# Patient Record
Sex: MALE | Race: ASIAN | Employment: UNEMPLOYED | URBAN - METROPOLITAN AREA
[De-identification: names, ages, dates, MRNs, and addresses within clinical notes are randomized per-mention and may not be internally consistent; named-entity substitution may affect disease eponyms.]

---

## 2017-12-29 ENCOUNTER — TRANSFERRED RECORDS (OUTPATIENT)
Dept: HEALTH INFORMATION MANAGEMENT | Facility: CLINIC | Age: 57
End: 2017-12-29

## 2018-01-03 ENCOUNTER — TRANSFERRED RECORDS (OUTPATIENT)
Dept: HEALTH INFORMATION MANAGEMENT | Facility: CLINIC | Age: 58
End: 2018-01-03

## 2018-01-10 ENCOUNTER — TRANSFERRED RECORDS (OUTPATIENT)
Dept: HEALTH INFORMATION MANAGEMENT | Facility: CLINIC | Age: 58
End: 2018-01-10

## 2018-01-15 ENCOUNTER — CARE COORDINATION (OUTPATIENT)
Dept: SURGERY | Facility: CLINIC | Age: 58
End: 2018-01-15

## 2018-01-15 NOTE — PROGRESS NOTES
Surgical Oncology/Hepatobiliary Surgery Referral      Referring provider: Dr. Stock - [facility]      Referred to: Dr. Nunez - Surgical oncology - Hepatobiliary Surgeon      Referral Received: 01/15/18      Evaluation for: HCC      Oncology provider (if applicable): Dr. Stock      Clinical History (per RN review of records provided):      - History of chronic active Hepatitis B, was told he had it over 17 years ago.     - Patient followed with hepatitis serologies and ultrasounds over the years. Seen most recently with Frances Garza NP with [facility]     - Most recent abdominal US revealed lesion in the right lobe, patient was then referred for MRI for further evaluation.     - AFP was normal at 2.7 on 12/14/2017 and has been followed back to 2014.     - LFTs are all WNL as well as of 12/14/2017.     - Patient was sent for biopsies of mass based on imaging characteristics and normal AFP to confirm dx.     Imaging: Full reports are available in Care Everywhere.     MRI  12/21/2017   CONCLUSION:  1.  Heterogeneous appearance of the liver. Enlarging lobulated mass in the right hepatic lobe posteriorly, worrisome for a gradually enlarging hepatocellular carcinoma. Likely foci of intrahepatic shunting. No adenopathy, ascites or central venous thrombus.  2.  Right adrenal gland abuts the lobulated right hepatic mass.  3.  Above study performed and interpreted in consultation with Dr. Jeni Mullins.    CT Chest 1/10/2018  CONCLUSION:   1.  Tiny left lung nodules.  2.  Mild tubular bronchiectasis.  3.  Borderline right hilar adenopathy.  4.  Multiple enhancing liver lesions presumably related to the patient's known hepatocellular carcinoma.    US 12/1/2017  CONCLUSION:  1.  There is a hyperechoic lesion in the right hepatic lobe which has increased in size currently measures 3.9 x 2.6 x 3.5 cm. Given the patient's underlying hepatitis B and the increase in size of this lesion,  further evaluation with MRI is recommended. The other 2 lesions are relatively stable.    Pathology:     US guided biopsy 1/10/2018:   Final Cytologic Diagnosis:   Liver, Right Posterior Lobe, Ultrasound Guided Needle Core Biopsy:   POSITIVE FOR MALIGNANCY   Hepatocellular Carcinoma        Staging complete (if applicable): Yes.

## 2018-01-18 ENCOUNTER — OFFICE VISIT (OUTPATIENT)
Dept: SURGERY | Facility: CLINIC | Age: 58
End: 2018-01-18
Attending: SURGERY
Payer: COMMERCIAL

## 2018-01-18 VITALS
DIASTOLIC BLOOD PRESSURE: 73 MMHG | WEIGHT: 147.71 LBS | HEIGHT: 66 IN | OXYGEN SATURATION: 99 % | TEMPERATURE: 97.7 F | RESPIRATION RATE: 16 BRPM | BODY MASS INDEX: 23.74 KG/M2 | HEART RATE: 90 BPM | SYSTOLIC BLOOD PRESSURE: 115 MMHG

## 2018-01-18 DIAGNOSIS — C22.0 HCC (HEPATOCELLULAR CARCINOMA) (H): Primary | ICD-10-CM

## 2018-01-18 PROCEDURE — T1013 SIGN LANG/ORAL INTERPRETER: HCPCS | Mod: U3,ZF

## 2018-01-18 PROCEDURE — G0463 HOSPITAL OUTPT CLINIC VISIT: HCPCS | Mod: ZF

## 2018-01-18 RX ORDER — SIMETHICONE 125 MG
125 TABLET,CHEWABLE ORAL
COMMUNITY
Start: 2018-01-03

## 2018-01-18 RX ORDER — ACETAMINOPHEN 500 MG
500 TABLET ORAL
Status: ON HOLD | COMMUNITY
Start: 2016-03-15 | End: 2018-01-25

## 2018-01-18 RX ORDER — GLYBURIDE 5 MG/1
5 TABLET ORAL
COMMUNITY
Start: 2017-12-14 | End: 2018-12-14

## 2018-01-18 RX ORDER — ATORVASTATIN CALCIUM 40 MG/1
40 TABLET, FILM COATED ORAL
COMMUNITY
Start: 2017-11-21

## 2018-01-18 RX ORDER — TENOFOVIR DISOPROXIL FUMARATE 300 MG/1
300 TABLET, FILM COATED ORAL
COMMUNITY
Start: 2017-12-29

## 2018-01-18 RX ORDER — GLUCOSAMINE HCL/CHONDROITIN SU 500-400 MG
CAPSULE ORAL
COMMUNITY
Start: 2014-09-16

## 2018-01-18 RX ORDER — LANCETS 30 GAUGE
EACH MISCELLANEOUS
COMMUNITY
Start: 2014-09-16

## 2018-01-18 ASSESSMENT — PAIN SCALES - GENERAL: PAINLEVEL: NO PAIN (0)

## 2018-01-18 NOTE — LETTER
1/18/2018       RE: Ceasar Hui  [address]  [place] [address]     Dear Colleague,    Thank you for referring your patient, Ceasar Hui, to the [facility]. Please see a copy of my visit note below.    REASON FOR EVALUATION:  Newly diagnosed hepatocellular cancer.      HISTORY OF PRESENT ILLNESS:  Mr. Hui is a 57-year-old gentleman who was recently found to have an enlarging mass in the posterior right lobe of his liver which previously measured about 2 cm but on most recent imaging measured 4.8 cm in greatest dimension.  He has a known history of hepatitis B and is being treated currently with tenofovir.  He is otherwise in excellent health.  He was seen by Dr. Wilder Stock from medical oncology at [facility] and I was asked by Dr. tSock to see Mr. Hui for consideration of surgical resection.      PAST MEDICAL HISTORY:  Hyperlipidemia, vitamin D deficiency, depression, psychosis, diabetes, newly diagnosed hepatocellular carcinoma, chronic hepatitis B.      MEDICATIONS:  Acetaminophen, aspirin, atorvastatin, Accu-Chek blood glucose strips, vitamin D3, glyburide, metformin and tenofovir.      SOCIAL HISTORY:  The patient is a native of [place].  He presents to the office today with his 2 sons, Khari and Darren.  Mr. Hui is currently not working due to disability.  He immigrated in 2000.  He currently lives in [place] with his wife.        He is fully functional with regard to his activities of daily living and has an ECOG performance status of 0.      LABORATORY:  Lab evaluations include a bilirubin of 0.6, a creatinine of 0.8 and alpha-fetoprotein of 2.7.  His albumin is 4.3.  The patient did have a biopsy performed of his liver lesion on 01/10/2018 and that was positive for malignancy consistent with hepatocellular carcinoma.      I had a long discussion with Mr. Hui and his sons today in the presence of an  discussing the management of hepatocellular cancer in the absence  of any evidence of cirrhosis.  I discussed that surgical resection is typically the primary treatment, although transplantation is sometimes necessary, particularly for potential intrahepatic recurrences in the future.  Mr. Hui's lesion lies on the posterior right lobe of the liver, segment 6/7, and should be surgically resectable.  I discussed that we would plan on a laparoscopic hand-assisted partial right hepatectomy with intraoperative ultrasound.  I discussed risks of surgery including bleeding, infection, bile leak, worsening liver function, venous thromboembolism and other unforeseeable events.  Overall, he seems to be in good health and has adequate liver function and I think will tolerate the surgery quite well.  I did also clearly discuss the risks of recurrent disease.  I also touched on the possibility of future transplantation.  However, given his preserved liver function and the resectability of this lesion, surgical resection at this time would be most appropriate.      We will discuss Mr. Hui's case at our weekly multidisciplinary liver tumor conference tomorrow and will tentatively move forward with getting him on the operating room schedule.      A total of over an hour was spent on this case, more than half that time was in face-to-face time with Mr. Hui and his family today.       Again, thank you for allowing me to participate in the care of your patient.      Sincerely,    Esequiel Nunez MD

## 2018-01-18 NOTE — MR AVS SNAPSHOT
After Visit Summary   1/18/2018    Ceaasr Hui    MRN: [id number]           Patient Information     Date Of Birth          1960        Visit Information        Provider Department      1/18/2018 9:15 AM Mary Lou Echevarria; Esequiel Nunez MD [facility]        Today's Diagnoses     HCC (hepatocellular carcinoma) (H)    -  1      Care Instructions    Below is a brief summary of your discussion and care plan from today's visit below.   ______________________________________________________________________    As discussed with Dr. Nunez we will proceed with the following:     -Surgical intervention on 1/25/2018.  Check in at the admissions desk at 6:30 am and procedure is scheduled to start at 8:30 am.     The following studies and appointment will need to be completed prior to your surgery date. Failure to complete these could lead to canceling or a delay to your surgery.     Please schedule your pre op history and physical with your primary care provider within 30 days of surgery date. If the provider is not a [facility] provider please have information faxed to [phone].      If you do not have a primary care provider we can also schedule you with our preoperative assessment center for a preoperative history and physical.     ______________________________________________________________________    It was a pleasure seeing you in clinic today - please be in touch if there are any further questions that arise following today's visit.  During business hours, you may reach my Clinic Coordinator at [phone].  For urgent/emergent questions after business hours, you may reach the on-call Surgery Resident by contacting the [facility]  at [phone].    Any benign/non-urgent test results are usually communicated via letter or [facility] message within 1-2 weeks after completion.  Urgent results (those that require a change in the previously-discussed care plan) are  usually communicated via a phone call once available from our clinic staff to discuss the results and the next steps in your evaluation.    I recommend signing up for eco4cloudt access if you have not already done so and are comfortable with using a computer.  This allows for online access to your lab results and also helps you communicate efficiently with my clinic should any questions arise in your care.      Sincerely,    Madisyn Tyler RN  Care Coordinator -   Phone: [phone]  Fax: [phone]      SURGERY PACKET            Your surgery is scheduled:    Date: 1/25/2018  ________________________________    Time: 8:30 am   ________________________________    Please arrive at:  6:30 am   ________________________________    Surgeon's Name: Dr. Nunez  ________________________________        Pre-Op Physical Fax Numbers:             Critical access hospital Pre-Admissions      [address]      Fax: [phone]      Phone: [phone]        Your surgery is located at:      [address] [address] [phone]      [email or web address]       Before Your Surgery  For Patients and Visitors at [facility]    Welcome  As you get ready for surgery, you may have a lot of questions.  This brochure will help you know what to expect before and after surgery.  You and your family are the most important members of your health care team.  You will need to take an active role in your care.    Be sure to ask questions and learn all that you can about your surgery.  If you have any safety concerns, we urge you to tell a nurse as soon as possible.   This brochure is for information only.  It does not replace the advice of your doctor.  Always follow your doctor's advice.    Please tell us if you need a .    GETTING READY FOR SURGERY  Always follow your surgeon's instructions.  If you don't, your surgery could  be canceled.  Please use the following checklist.    Within 30 Days of Surgery:    Have a pre-surgery physical exam with your family doctor or partner or our Preoperative Ass[facility]ment Center (PAC).     If you use a [facility], all of your information from the pre-op physical will be in the [employer] computer system.     Failure to complete the pre-operative history and physical will results in cancellation and rescheduling the procedure.     Ask the doctor to send all of your results to the hospital before the surgery.  The doctor also may ask you to bring the results with you on the day of surgery or you can fax them to [phone].   Tell the doctor if:    You are allergic to latex or rubber  (Latex and rubber gloves are often used in medical care).    You are taking any medicines (including aspirin), vitamins (Vitamin E, Fish Oil, Omegas) or herbal products.  You will need to stop taking some medicines before surgery.    You have any medical problems (allergies, diabetes or heart disease, for example).    You have a pacemaker or an AICD (automatic implanted cardiac defibrillator).  If you do, please bring the ID card with you on the day of surgery.    You are a smoker.  People who smoke have a higher risk of infection after surgery.  Ask your doctor how you can quit smoking.  Within 7 days of Surgery:    Pre-register with the hospital.  Please use the hospital's phone number listed on the first page of this brochure.  Or, to register online, go to [email or web address].      Prior to your surgical procedure, a nurse will be contacting you to obtain a health history ([phone]).  Additionally, someone from the Admissions Department will also contact you for preregistration ([phone]).      Call your insurance company.  Ask if you need pre-approval for your surgery.  If you do not have insurance, please let us know.      Arrange for someone to drive you home after surgery.  If you will have same-day  surgery, you may not drive or take public transportation home by yourself.    Arrange for someone to stay with you for 24 hours after you go home.  This person must be a responsible adult (ie- Family member or friend).  The Day Before Surgery:     Call your surgeon if there are any changes in your health.  This includes signs of a cold or flu (such as a sore throat, runny nose, cough, rash or fever).    Do not smoke, drink alcohol or take over-the-counter medicine (unless your surgeon tells you to) for 24 hours before and after surgery.    If you take prescribed drugs:  You may need to stop them until after the surgery.  Follow your doctor's orders.  You may resume Aspirin and/or blood thinners after your surgery as directed by your physician/surgeon.    NO FOOD OR DRINK AFTER MIDNIGHT.  Follow your surgeon's orders for eating and drinking.  You need to have an empty stomach before surgery.  This will make the surgery as safe as possible.  If you don't follow your doctor's orders, your surgery could be changed to another date.  A nurse will call you within a few days of surgery to go over these and other instructions.  If you do not hear from them, please call them at [phone].   The Day of Surgery:    Take a shower or bath the night before and the morning of surgery.  Use antiseptic soap or the soap your surgeon gave you.  Gently clean the skin.  Do not shave or scrub your surgery site.    Please remove deodorant, cologne, scented lotion, makeup, nail polish and jewelry (including rings and body piercings).  If you wear artificial nails, please remove at least one nail before coming to the hospital.    Wear clean, loose clothing to the hospital.    Bring these items to the hospital:  1. Your insurance card.  2. Money for parking and co-pays (for medicines or the surgery), if needed.   3. A list of all the medicines you take.  Include vitamins, minerals, herbs and over-the-counter drugs.  Note any drug  allergies.  4. A copy of your advance health care directive, if you have one.  This tells us what treatment you would want -- and who would make health care decisions -- if you could no longer speak for yourself.  You may request this form in advance or download it from [email or web address].  5. A case for any glasses, contact lenses, hearing aids or dentures.  6. Your inhaler or CPAP machine, if you use these at home.  Leave extra cash, jewelry and other valuables at home.    When You Arrive:  When you get to the hospital, you will:    Check in.  If you are under age 18, you must be with a parent or legal guardian.    Sign consent forms, if you haven't already.  These forms state that you know the risks and benefits of surgery.  When you sign the forms, you give us permission to do the surgery.  Do not sign them unless you understand what will happen during and after your surgery.  If you have any questions about your surgery, ask to speak with your doctor before you sign the forms.  If you don't understand the answers, ask again.    Receive a copy of the Patients Bill of Rights.  If you do not receive a copy, please ask for one.    Change into hospital clothes.  Your belongings will be placed in a bag.  We will return them to you after surgery.    Meet with the anesthesia provider.  He or she will tell you what kind of anesthesia (medicine) will be used to keep you comfortable during surgery.  Remember: It's okay to remind doctors and nurses to wash their hands before touching you.   In most cases, your surgeon will use a marker to write his or her initials on the surgery site.  This ensures that the exact site is operated on.  For safety reasons, we will ask you the same questions many times.  For example, we may ask your name and birth date over and over again.  Friends and family can stay with you until it's time for surgery.  While you're in surgery, they will be in the waiting area.  Please note that cell  "phones are not allowed in some patient care areas.  If you have questions about what will happen in the operating room, talk to your care team.  After Surgery:    We will move you to a recovery room where we will watch you closely.  If you have any pain or discomfort, tell your nurse.  He or she will try to make you comfortable.      If you are staying overnight we will move you to your hospital room after you are awake.    If you are going home we will move you to another room.  Friends and family may be able to join you.  The length of time you spend in recovery depends on the type of medicine you received, your medical condition, and the type of surgery you had.  Dealing with pain:  A nurse will check your comfort level often during your stay.  He or she will work with you to manage your pain.  Remember:    All pain is real.  There are many ways to control pain.  We can help you decide what works best for you.    Ask for pain medicine when you need it.  Don't try to \"tough it out\" -- this can make you feel worse.  Always take your medicine as ordered.    Medicine doesn't work the same for everyone.  If your medicine isn't working tell your nurse.  There may be other medicines or treatments we can try.  Going Home:  We will let you know when you're ready to leave the hospital.  Before you leave, we will tell you how to care for yourself at home and prevent infections.  If you do not understand something, please say so.  We will answer any questions you have.  We will then help you get ready to leave.  You must have an adult with you for the first 24 hours after you leave the hospital. Take it easy when you get home.  You will need some time to recover -- you may be more tired than you realize at first.  Rest and relax for at least the first 24 hours at home.  You'll feel better and heal faster if you take good care of yourself.                      Pre-Operative Surgery Scrub    Purpose:   The skin harbors a large " variety of bacteria, both infectious and noninfectious.  Showering with an antiseptic soap prior to an invasive procedure will decrease the number of transient and resident (good and bad) bacteria that is normally found on the skin.    Procedure:      Shower or bathe with 1/2 of the bottle of antiseptic soap (enclosed) the evening before and 1/2 of the bottle the morning of surgery (bathing the day of the procedure is most important).       Apply the soap at full strength (use the entire bottle).  Gently clean the skin, rinse, and dry with a clean towel that is freshly laundered (out of the dryer) and then put on clean clothing that is freshly laundered.        We have given you information regarding pre-op showering.  We recommend that patients wash with an antiseptic soap prior to surgery.  This cleanser will be given to you at the clinic or mailed to your home.  It is advised that you liberally wash the specific area surgery area the night before, and again in the morning before the surgery.  Do not apply lotion afterward.  We would like to keep the skin as clean as possible.    Thank you for following these important instructions.      You have been scheduled for surgery and we would like to give you some information that will assist in helping get the best possible outcome.      Before Surgery:     If for any reason you decide not to have the surgery, please contact our office at [phone].  We can easily cancel or reschedule the procedure. After hours: [facility] [phone], [place] [phone]).      Any pain related to the surgery that occurs before the surgery needs to be reported and managed by your primary care or referring doctor.      Please keep in mind that the time of surgery is subject to change.  Make sure you have nothing to eat or drink after midnight.  If your surgery is later in the afternoon, this recommendation might change, but not until the day before surgery after the actual time of the  surgery has been established.    After Surgery:  When you are discharged from the recovery room, the nurses will review instructions with you and your caregiver.      Please wash your hands every time you touch the wound or change bandages or dressings.      Do not submerge the wound in water.  You may not use a bathtub or hot tub until the wound is closed.  The wait time frame is generally 2-3 weeks but any open area can be a source of incoming bacteria, so it is better to be on the safe side and avoid the tube until your wound is fully healed.      You may take a shower 24 hours after surgery.  Double check with your surgeon if it is ok for water to run over a wound, whether it has been sutured, stapled, glued or is open.  You may gently wash the wound using the antiseptic soap provided for your pre-surgery showering (do not use a washcloth).  Any mild soap will work as well.      Many surgical wounds will have small white strips of tape on them called Steri Strips.  Do not remove these.  The edges will curl and fall off within 7-10 days with normal showering.      If you are going home with sutures (stitches) or staples, you must return to the clinic to have them taken out, usually within 1-2 weeks.      Signs and symptoms of infection include:  1. Fever, temperature over 101.5 ' F  2. Redness  3. Swelling  4. Increasing pain  5. Green or yellow drainage which may or may not have a foul odor.      If you are deaf or hard of hearing, please let us know.  We provide many free services including sign language interpreters, oval interpreters, TTYs, telephone amplifiers, note takers, and written materials.                    Follow-ups after your visit        Who to contact     If you have questions or need follow up information about today's clinic visit or your schedule please contact [facility] directly at [phone].  Normal or non-critical lab and imaging results will be communicated to you  "by [employer], letter or phone within 4 business days after the clinic has received the results. If you do not hear from us within 7 days, please contact the clinic through [employer] or phone. If you have a critical or abnormal lab result, we will notify you by phone as soon as possible.  Submit refill requests through [employer] or call your pharmacy and they will forward the refill request to us. Please allow 3 business days for your refill to be completed.          Additional Information About Your Visit        [employer] Information     [employer] lets you send messages to your doctor, view your test results, renew your prescriptions, schedule appointments and more. To sign up, go to [email or web address] . Click on \"Log in\" on the left side of the screen, which will take you to the Welcome page. Then click on \"Sign up Now\" on the right side of the page.     You will be asked to enter the access code listed below, as well as some personal information. Please follow the directions to create your username and password.     Your access code is: [email or web address]  Expires: 2018  6:30 AM     Your access code will  in 90 days. If you need help or a new code, please call your [facility] clinic or [phone].        Care EveryWhere ID     This is your Care EveryWhere ID. This could be used by other organizations to access your [facility] medical records  [id number]        Your Vitals Were     Pulse Temperature Respirations Height Pulse Oximetry BMI (Body Mass Index)    90 97.7  F (36.5  C) (Oral) 16 1.686 m (5' 6.38\") 99% 23.57 kg/m2       Blood Pressure from Last 3 Encounters:   18 115/73    Weight from Last 3 Encounters:   18 67 kg (147 lb 11.3 oz)              We Performed the Following     Madhavi-Operative Worksheet        Primary Care Provider    None Specified       No primary provider on file.        Equal Access to Services     JAMA CASTELLANOS AH: Hadii minna Brumfield, rené salgado, becca cummings " meghan quevedoalfie krugeraan ah. So [facility] [phone].    ATENCIÓN: Si maurila marck, tiene a angelo disposición servicios gratuitos de asistencia lingüística. Celestina al [phone].    We comply with applicable federal civil rights laws and [place] laws. We do not discriminate on the basis of race, color, national origin, age, disability, sex, sexual orientation, or gender identity.            Thank you!     Thank you for choosing [facility]  for your care. Our goal is always to provide you with excellent care. Hearing back from our patients is one way we can continue to improve our services. Please take a few minutes to complete the written survey that you may receive in the mail after your visit with us. Thank you!             Your Updated Medication List - Protect others around you: Learn how to safely use, store and throw away your medicines at www.disposemymeds.org.          This list is accurate as of: 1/18/18 10:52 AM.  Always use your most recent med list.                   Brand Name Dispense Instructions for use Diagnosis    acetaminophen 500 MG tablet    TYLENOL     Take 500 mg by mouth        atorvastatin 40 MG tablet    LIPITOR     Take 40 mg by mouth        BLOOD GLUCOSE TEST STRIPS Strp      two times a day. Use as directed. Pharmacy dispense brand based on insurance.        glyBURIDE 5 MG tablet    DIABETA /MICRONASE     Take 5 mg by mouth        Lancets Misc      Testing blood sugars 2 time(s) a day. Pharmacist may substitute meter/supplies if insurance or patient requires specific equipment or model        metFORMIN 1000 MG tablet    GLUCOPHAGE     Take 1,000 mg by mouth        simethicone 125 MG Chew chewable tablet    MYLICON     Take 125 mg by mouth        tenofovir 300 MG tablet    VIREAD     Take 300 mg by mouth        vitamin D3 1000 UNITS Caps      Take 1,000 Units by mouth

## 2018-01-18 NOTE — PROGRESS NOTES
REASON FOR EVALUATION:  Newly diagnosed hepatocellular cancer.      HISTORY OF PRESENT ILLNESS:  Mr. Hui is a 57-year-old gentleman who was recently found to have an enlarging mass in the posterior right lobe of his liver which previously measured about 2 cm but on most recent imaging measured 4.8 cm in greatest dimension.  He has a known history of hepatitis B and is being treated currently with tenofovir.  He is otherwise in excellent health.  He was seen by Dr. Wilder Stock from medical oncology at [facility] and I was asked by Dr. Stock to see Mr. Hui for consideration of surgical resection.      PAST MEDICAL HISTORY:  Hyperlipidemia, vitamin D deficiency, depression, psychosis, diabetes, newly diagnosed hepatocellular carcinoma, chronic hepatitis B.      MEDICATIONS:  Acetaminophen, aspirin, atorvastatin, Accu-Chek blood glucose strips, vitamin D3, glyburide, metformin and tenofovir.      SOCIAL HISTORY:  The patient is a native of [place].  He presents to the office today with his 2 sons, Jada.  Mr. Hui is currently not working due to disability.  He immigrated in 2000.  He currently lives in [place] with his wife.        He is fully functional with regard to his activities of daily living and has an ECOG performance status of 0.      LABORATORY:  Lab evaluations include a bilirubin of 0.6, a creatinine of 0.8 and alpha-fetoprotein of 2.7.  His albumin is 4.3.  The patient did have a biopsy performed of his liver lesion on 01/10/2018 and that was positive for malignancy consistent with hepatocellular carcinoma.      I had a long discussion with Mr. Hui and his sons today in the presence of an  discussing the management of hepatocellular cancer in the absence of any evidence of cirrhosis.  I discussed that surgical resection is typically the primary treatment, although transplantation is sometimes necessary, particularly for potential intrahepatic recurrences in the future.   Mr. Hui's lesion lies on the posterior right lobe of the liver, segment 6/7, and should be surgically resectable.  I discussed that we would plan on a laparoscopic hand-assisted partial right hepatectomy with intraoperative ultrasound.  I discussed risks of surgery including bleeding, infection, bile leak, worsening liver function, venous thromboembolism and other unforeseeable events.  Overall, he seems to be in good health and has adequate liver function and I think will tolerate the surgery quite well.  I did also clearly discuss the risks of recurrent disease.  I also touched on the possibility of future transplantation.  However, given his preserved liver function and the resectability of this lesion, surgical resection at this time would be most appropriate.      We will discuss Mr. Hui's case at our weekly multidisciplinary liver tumor conference tomorrow and will tentatively move forward with getting him on the operating room schedule.      A total of over an hour was spent on this case, more than half that time was in face-to-face time with Mr. Hui and his family today.

## 2018-01-18 NOTE — NURSING NOTE
"Oncology Rooming Note    January 18, 2018 9:54 AM   Ceasar Hui is a 57 year old male who presents for:    Chief Complaint   Patient presents with     Oncology Clinic Visit     New for HCC     Initial Vitals: /73  Pulse 90  Temp 97.7  F (36.5  C) (Oral)  Resp 16  Ht 1.686 m (5' 6.38\")  Wt 67 kg (147 lb 11.3 oz)  SpO2 99%  BMI 23.57 kg/m2 Estimated body mass index is 23.57 kg/(m^2) as calculated from the following:    Height as of this encounter: 1.686 m (5' 6.38\").    Weight as of this encounter: 67 kg (147 lb 11.3 oz). Body surface area is 1.77 meters squared.  No Pain (0) Comment: Data Unavailable   No LMP for male patient.  Allergies reviewed: Yes  Medications reviewed: Yes    Medications: Medication refills not needed today.  Pharmacy name entered into EPIC: Data Unavailable    Clinical concerns: results  Enrique  was notified.    10 minutes for nursing intake (face to face time)     Milagro Kee MA              "

## 2018-01-18 NOTE — PATIENT INSTRUCTIONS
Below is a brief summary of your discussion and care plan from today's visit below.   ______________________________________________________________________    As discussed with Dr. Nunez we will proceed with the following:     -Surgical intervention on 1/25/2018.  Check in at the admissions desk at 6:30 am and procedure is scheduled to start at 8:30 am.     The following studies and appointment will need to be completed prior to your surgery date. Failure to complete these could lead to canceling or a delay to your surgery.     Please schedule your pre op history and physical with your primary care provider within 30 days of surgery date. If the provider is not a [facility] provider please have information faxed to [phone].      If you do not have a primary care provider we can also schedule you with our preoperative assessment center for a preoperative history and physical.     ______________________________________________________________________    It was a pleasure seeing you in clinic today - please be in touch if there are any further questions that arise following today's visit.  During business hours, you may reach my Clinic Coordinator at [phone].  For urgent/emergent questions after business hours, you may reach the on-call Surgery Resident by contacting the [facility]  at [phone].    Any benign/non-urgent test results are usually communicated via letter or MyOtherDrivehart message within 1-2 weeks after completion.  Urgent results (those that require a change in the previously-discussed care plan) are usually communicated via a phone call once available from our clinic staff to discuss the results and the next steps in your evaluation.    I recommend signing up for [employer] access if you have not already done so and are comfortable with using a computer.  This allows for online access to your lab results and also helps you communicate efficiently with my clinic should any  questions arise in your care.      Sincerely,    Madisyn Tyler RN  Care Coordinator -   Phone: [phone]  Fax: [phone]      SURGERY PACKET            Your surgery is scheduled:    Date: 1/25/2018  ________________________________    Time: 8:30 am   ________________________________    Please arrive at:  6:30 am   ________________________________    Surgeon's Name: Dr. Nunez  ________________________________        Pre-Op Physical Fax Numbers:             [facility] Pre-Admissions      [address]      Fax: [phone]      Phone: [phone]        Your surgery is located at:      [address] [address]      [phone]      [email or web address]       Before Your Surgery  For Patients and Visitors at [facility]    Welcome  As you get ready for surgery, you may have a lot of questions.  This brochure will help you know what to expect before and after surgery.  You and your family are the most important members of your health care team.  You will need to take an active role in your care.    Be sure to ask questions and learn all that you can about your surgery.  If you have any safety concerns, we urge you to tell a nurse as soon as possible.   This brochure is for information only.  It does not replace the advice of your doctor.  Always follow your doctor's advice.    Please tell us if you need a .    GETTING READY FOR SURGERY  Always follow your surgeon's instructions.  If you don't, your surgery could be canceled.  Please use the following checklist.    Within 30 Days of Surgery:    Have a pre-surgery physical exam with your family doctor or partner or our Preoperative Assessement Center (PAC).     If you use a [facility], all of your information from the pre-op physical will be in the [employer] computer system.     Failure to complete the pre-operative history and  physical will results in cancellation and rescheduling the procedure.     Ask the doctor to send all of your results to the hospital before the surgery.  The doctor also may ask you to bring the results with you on the day of surgery or you can fax them to [phone].   Tell the doctor if:    You are allergic to latex or rubber  (Latex and rubber gloves are often used in medical care).    You are taking any medicines (including aspirin), vitamins (Vitamin E, Fish Oil, Omegas) or herbal products.  You will need to stop taking some medicines before surgery.    You have any medical problems (allergies, diabetes or heart disease, for example).    You have a pacemaker or an AICD (automatic implanted cardiac defibrillator).  If you do, please bring the ID card with you on the day of surgery.    You are a smoker.  People who smoke have a higher risk of infection after surgery.  Ask your doctor how you can quit smoking.  Within 7 days of Surgery:    Pre-register with the hospital.  Please use the hospital's phone number listed on the first page of this brochure.  Or, to register online, go to [email or web address].      Prior to your surgical procedure, a nurse will be contacting you to obtain a health history ([phone]).  Additionally, someone from the Admissions Department will also contact you for preregistration ([phone]).      Call your insurance company.  Ask if you need pre-approval for your surgery.  If you do not have insurance, please let us know.      Arrange for someone to drive you home after surgery.  If you will have same-day surgery, you may not drive or take public transportation home by yourself.    Arrange for someone to stay with you for 24 hours after you go home.  This person must be a responsible adult (ie- Family member or friend).  The Day Before Surgery:     Call your surgeon if there are any changes in your health.  This includes signs of a cold or flu (such as a sore throat, runny nose,  cough, rash or fever).    Do not smoke, drink alcohol or take over-the-counter medicine (unless your surgeon tells you to) for 24 hours before and after surgery.    If you take prescribed drugs:  You may need to stop them until after the surgery.  Follow your doctor's orders.  You may resume Aspirin and/or blood thinners after your surgery as directed by your physician/surgeon.    NO FOOD OR DRINK AFTER MIDNIGHT.  Follow your surgeon's orders for eating and drinking.  You need to have an empty stomach before surgery.  This will make the surgery as safe as possible.  If you don't follow your doctor's orders, your surgery could be changed to another date.  A nurse will call you within a few days of surgery to go over these and other instructions.  If you do not hear from them, please call them at [phone].   The Day of Surgery:    Take a shower or bath the night before and the morning of surgery.  Use antiseptic soap or the soap your surgeon gave you.  Gently clean the skin.  Do not shave or scrub your surgery site.    Please remove deodorant, cologne, scented lotion, makeup, nail polish and jewelry (including rings and body piercings).  If you wear artificial nails, please remove at least one nail before coming to the hospital.    Wear clean, loose clothing to the hospital.    Bring these items to the hospital:  1. Your insurance card.  2. Money for parking and co-pays (for medicines or the surgery), if needed.   3. A list of all the medicines you take.  Include vitamins, minerals, herbs and over-the-counter drugs.  Note any drug allergies.  4. A copy of your advance health care directive, if you have one.  This tells us what treatment you would want -- and who would make health care decisions -- if you could no longer speak for yourself.  You may request this form in advance or download it from [email or web address].  5. A case for any glasses, contact lenses, hearing aids or dentures.  6. Your inhaler or  CPAP machine, if you use these at home.  Leave extra cash, jewelry and other valuables at home.    When You Arrive:  When you get to the hospital, you will:    Check in.  If you are under age 18, you must be with a parent or legal guardian.    Sign consent forms, if you haven't already.  These forms state that you know the risks and benefits of surgery.  When you sign the forms, you give us permission to do the surgery.  Do not sign them unless you understand what will happen during and after your surgery.  If you have any questions about your surgery, ask to speak with your doctor before you sign the forms.  If you don't understand the answers, ask again.    Receive a copy of the Patients Bill of Rights.  If you do not receive a copy, please ask for one.    Change into hospital clothes.  Your belongings will be placed in a bag.  We will return them to you after surgery.    Meet with the anesthesia provider.  He or she will tell you what kind of anesthesia (medicine) will be used to keep you comfortable during surgery.  Remember: It's okay to remind doctors and nurses to wash their hands before touching you.   In most cases, your surgeon will use a marker to write his or her initials on the surgery site.  This ensures that the exact site is operated on.  For safety reasons, we will ask you the same questions many times.  For example, we may ask your name and birth date over and over again.  Friends and family can stay with you until it's time for surgery.  While you're in surgery, they will be in the waiting area.  Please note that cell phones are not allowed in some patient care areas.  If you have questions about what will happen in the operating room, talk to your care team.  After Surgery:    We will move you to a recovery room where we will watch you closely.  If you have any pain or discomfort, tell your nurse.  He or she will try to make you comfortable.      If you are staying overnight we will move you to  "your hospital room after you are awake.    If you are going home we will move you to another room.  Friends and family may be able to join you.  The length of time you spend in recovery depends on the type of medicine you received, your medical condition, and the type of surgery you had.  Dealing with pain:  A nurse will check your comfort level often during your stay.  He or she will work with you to manage your pain.  Remember:    All pain is real.  There are many ways to control pain.  We can help you decide what works best for you.    Ask for pain medicine when you need it.  Don't try to \"tough it out\" -- this can make you feel worse.  Always take your medicine as ordered.    Medicine doesn't work the same for everyone.  If your medicine isn't working tell your nurse.  There may be other medicines or treatments we can try.  Going Home:  We will let you know when you're ready to leave the hospital.  Before you leave, we will tell you how to care for yourself at home and prevent infections.  If you do not understand something, please say so.  We will answer any questions you have.  We will then help you get ready to leave.  You must have an adult with you for the first 24 hours after you leave the hospital. Take it easy when you get home.  You will need some time to recover -- you may be more tired than you realize at first.  Rest and relax for at least the first 24 hours at home.  You'll feel better and heal faster if you take good care of yourself.                      Pre-Operative Surgery Scrub    Purpose:   The skin harbors a large variety of bacteria, both infectious and noninfectious.  Showering with an antiseptic soap prior to an invasive procedure will decrease the number of transient and resident (good and bad) bacteria that is normally found on the skin.    Procedure:      Shower or bathe with 1/2 of the bottle of antiseptic soap (enclosed) the evening before and 1/2 of the bottle the morning of surgery " (bathing the day of the procedure is most important).       Apply the soap at full strength (use the entire bottle).  Gently clean the skin, rinse, and dry with a clean towel that is freshly laundered (out of the dryer) and then put on clean clothing that is freshly laundered.        We have given you information regarding pre-op showering.  We recommend that patients wash with an antiseptic soap prior to surgery.  This cleanser will be given to you at the clinic or mailed to your home.  It is advised that you liberally wash the specific area surgery area the night before, and again in the morning before the surgery.  Do not apply lotion afterward.  We would like to keep the skin as clean as possible.    Thank you for following these important instructions.      You have been scheduled for surgery and we would like to give you some information that will assist in helping get the best possible outcome.      Before Surgery:     If for any reason you decide not to have the surgery, please contact our office at [phone].  We can easily cancel or reschedule the procedure. After hours: [facility] [phone], [place] [phone]).      Any pain related to the surgery that occurs before the surgery needs to be reported and managed by your primary care or referring doctor.      Please keep in mind that the time of surgery is subject to change.  Make sure you have nothing to eat or drink after midnight.  If your surgery is later in the afternoon, this recommendation might change, but not until the day before surgery after the actual time of the surgery has been established.    After Surgery:  When you are discharged from the recovery room, the nurses will review instructions with you and your caregiver.      Please wash your hands every time you touch the wound or change bandages or dressings.      Do not submerge the wound in water.  You may not use a bathtub or hot tub until the wound is closed.  The wait time frame is  generally 2-3 weeks but any open area can be a source of incoming bacteria, so it is better to be on the safe side and avoid the tube until your wound is fully healed.      You may take a shower 24 hours after surgery.  Double check with your surgeon if it is ok for water to run over a wound, whether it has been sutured, stapled, glued or is open.  You may gently wash the wound using the antiseptic soap provided for your pre-surgery showering (do not use a washcloth).  Any mild soap will work as well.      Many surgical wounds will have small white strips of tape on them called Steri Strips.  Do not remove these.  The edges will curl and fall off within 7-10 days with normal showering.      If you are going home with sutures (stitches) or staples, you must return to the clinic to have them taken out, usually within 1-2 weeks.      Signs and symptoms of infection include:  1. Fever, temperature over 101.5 ' F  2. Redness  3. Swelling  4. Increasing pain  5. Green or yellow drainage which may or may not have a foul odor.      If you are deaf or hard of hearing, please let us know.  We provide many free services including sign language interpreters, oval interpreters, TTYs, telephone amplifiers, note takers, and written materials.

## 2018-01-23 ENCOUNTER — ANESTHESIA EVENT (OUTPATIENT)
Dept: SURGERY | Facility: CLINIC | Age: 58
DRG: 406 | End: 2018-01-23
Payer: COMMERCIAL

## 2018-01-25 ENCOUNTER — OFFICE VISIT (OUTPATIENT)
Dept: INTERPRETER SERVICES | Facility: CLINIC | Age: 58
End: 2018-01-25
Payer: COMMERCIAL

## 2018-01-25 ENCOUNTER — ANESTHESIA (OUTPATIENT)
Dept: SURGERY | Facility: CLINIC | Age: 58
DRG: 406 | End: 2018-01-25
Payer: COMMERCIAL

## 2018-01-25 ENCOUNTER — HOSPITAL ENCOUNTER (INPATIENT)
Facility: CLINIC | Age: 58
LOS: 5 days | Discharge: HOME | DRG: 406 | End: 2018-01-30
Attending: SURGERY | Admitting: SURGERY
Payer: COMMERCIAL

## 2018-01-25 DIAGNOSIS — Z78.9 DEEP VEIN THROMBOSIS (DVT) PROPHYLAXIS PRESCRIBED AT DISCHARGE: ICD-10-CM

## 2018-01-25 DIAGNOSIS — C22.0 HEPATOCELLULAR CARCINOMA (H): Primary | ICD-10-CM

## 2018-01-25 DIAGNOSIS — G89.18 ACUTE POST-OPERATIVE PAIN: ICD-10-CM

## 2018-01-25 DIAGNOSIS — K59.00 CONSTIPATION, UNSPECIFIED CONSTIPATION TYPE: ICD-10-CM

## 2018-01-25 LAB
ABO + RH BLD: NORMAL
ABO + RH BLD: NORMAL
ALBUMIN SERPL-MCNC: 3.7 G/DL (ref 3.4–5)
ALP SERPL-CCNC: 75 U/L (ref 40–150)
ALT SERPL W P-5'-P-CCNC: 238 U/L (ref 0–70)
ANION GAP SERPL CALCULATED.3IONS-SCNC: 10 MMOL/L (ref 3–14)
AST SERPL W P-5'-P-CCNC: 303 U/L (ref 0–45)
BASE DEFICIT BLDA-SCNC: 3.6 MMOL/L
BASE EXCESS BLDA CALC-SCNC: 2.2 MMOL/L
BASOPHILS # BLD AUTO: 0 10E9/L (ref 0–0.2)
BASOPHILS NFR BLD AUTO: 0.1 %
BILIRUB DIRECT SERPL-MCNC: 0.2 MG/DL (ref 0–0.2)
BILIRUB SERPL-MCNC: 0.8 MG/DL (ref 0.2–1.3)
BLD GP AB SCN SERPL QL: NORMAL
BLOOD BANK CMNT PATIENT-IMP: NORMAL
BUN SERPL-MCNC: 23 MG/DL (ref 7–30)
CA-I BLD-MCNC: 4.5 MG/DL (ref 4.4–5.2)
CA-I BLD-MCNC: 4.6 MG/DL (ref 4.4–5.2)
CALCIUM SERPL-MCNC: 8.6 MG/DL (ref 8.5–10.1)
CHLORIDE SERPL-SCNC: 106 MMOL/L (ref 94–109)
CO2 SERPL-SCNC: 25 MMOL/L (ref 20–32)
CREAT SERPL-MCNC: 0.77 MG/DL (ref 0.66–1.25)
CREAT SERPL-MCNC: 0.8 MG/DL (ref 0.66–1.25)
CREAT SERPL-MCNC: 0.84 MG/DL (ref 0.66–1.25)
DIFFERENTIAL METHOD BLD: ABNORMAL
EOSINOPHIL # BLD AUTO: 0 10E9/L (ref 0–0.7)
EOSINOPHIL NFR BLD AUTO: 0 %
ERYTHROCYTE [DISTWIDTH] IN BLOOD BY AUTOMATED COUNT: 13.9 % (ref 10–15)
GFR SERPL CREATININE-BSD FRML MDRD: >90 ML/MIN/1.7M2
GLUCOSE BLD-MCNC: 164 MG/DL (ref 70–99)
GLUCOSE BLD-MCNC: 245 MG/DL (ref 70–99)
GLUCOSE BLDC GLUCOMTR-MCNC: 135 MG/DL (ref 70–99)
GLUCOSE BLDC GLUCOMTR-MCNC: 159 MG/DL (ref 70–99)
GLUCOSE BLDC GLUCOMTR-MCNC: 170 MG/DL (ref 70–99)
GLUCOSE BLDC GLUCOMTR-MCNC: 184 MG/DL (ref 70–99)
GLUCOSE BLDC GLUCOMTR-MCNC: 282 MG/DL (ref 70–99)
GLUCOSE SERPL-MCNC: 133 MG/DL (ref 70–99)
HCO3 BLD-SCNC: 21 MMOL/L (ref 21–28)
HCO3 BLD-SCNC: 26 MMOL/L (ref 21–28)
HCT VFR BLD AUTO: 42.6 % (ref 40–53)
HGB BLD-MCNC: 13.9 G/DL (ref 13.3–17.7)
HGB BLD-MCNC: 14.1 G/DL (ref 13.3–17.7)
HGB BLD-MCNC: 14.6 G/DL (ref 13.3–17.7)
HGB BLD-MCNC: 15.2 G/DL (ref 13.3–17.7)
IMM GRANULOCYTES # BLD: 0.1 10E9/L (ref 0–0.4)
IMM GRANULOCYTES NFR BLD: 0.5 %
INR PPP: 1.01 (ref 0.86–1.14)
LYMPHOCYTES # BLD AUTO: 1.7 10E9/L (ref 0.8–5.3)
LYMPHOCYTES NFR BLD AUTO: 9.5 %
MAGNESIUM SERPL-MCNC: 2 MG/DL (ref 1.6–2.3)
MCH RBC QN AUTO: 26.7 PG (ref 26.5–33)
MCHC RBC AUTO-ENTMCNC: 33.1 G/DL (ref 31.5–36.5)
MCV RBC AUTO: 81 FL (ref 78–100)
MONOCYTES # BLD AUTO: 1.9 10E9/L (ref 0–1.3)
MONOCYTES NFR BLD AUTO: 10.7 %
NEUTROPHILS # BLD AUTO: 14.2 10E9/L (ref 1.6–8.3)
NEUTROPHILS NFR BLD AUTO: 79.2 %
NRBC # BLD AUTO: 0 10*3/UL
NRBC BLD AUTO-RTO: 0 /100
O2/TOTAL GAS SETTING VFR VENT: 33 %
O2/TOTAL GAS SETTING VFR VENT: 57 %
PCO2 BLD: 36 MM HG (ref 35–45)
PCO2 BLD: 37 MM HG (ref 35–45)
PH BLD: 7.38 PH (ref 7.35–7.45)
PH BLD: 7.46 PH (ref 7.35–7.45)
PLATELET # BLD AUTO: 133 10E9/L (ref 150–450)
PLATELET # BLD AUTO: 153 10E9/L (ref 150–450)
PLATELET # BLD AUTO: 157 10E9/L (ref 150–450)
PO2 BLD: 136 MM HG (ref 80–105)
PO2 BLD: 295 MM HG (ref 80–105)
POTASSIUM BLD-SCNC: 3.8 MMOL/L (ref 3.4–5.3)
POTASSIUM BLD-SCNC: 4.2 MMOL/L (ref 3.4–5.3)
POTASSIUM SERPL-SCNC: 3.8 MMOL/L (ref 3.4–5.3)
POTASSIUM SERPL-SCNC: 4.2 MMOL/L (ref 3.4–5.3)
PROT SERPL-MCNC: 7.2 G/DL (ref 6.8–8.8)
RBC # BLD AUTO: 5.28 10E12/L (ref 4.4–5.9)
SODIUM BLD-SCNC: 137 MMOL/L (ref 133–144)
SODIUM BLD-SCNC: 139 MMOL/L (ref 133–144)
SODIUM SERPL-SCNC: 141 MMOL/L (ref 133–144)
SPECIMEN EXP DATE BLD: NORMAL
WBC # BLD AUTO: 18 10E9/L (ref 4–11)

## 2018-01-25 PROCEDURE — 82565 ASSAY OF CREATININE: CPT | Performed by: SURGERY

## 2018-01-25 PROCEDURE — 25000128 H RX IP 250 OP 636: Performed by: STUDENT IN AN ORGANIZED HEALTH CARE EDUCATION/TRAINING PROGRAM

## 2018-01-25 PROCEDURE — 85049 AUTOMATED PLATELET COUNT: CPT | Performed by: SURGERY

## 2018-01-25 PROCEDURE — 88307 TISSUE EXAM BY PATHOLOGIST: CPT | Performed by: SURGERY

## 2018-01-25 PROCEDURE — C1888 ENDOVAS NON-CARDIAC ABL CATH: HCPCS | Performed by: SURGERY

## 2018-01-25 PROCEDURE — 25800025 ZZH RX 258: Performed by: SURGERY

## 2018-01-25 PROCEDURE — 82803 BLOOD GASES ANY COMBINATION: CPT | Performed by: ANESTHESIOLOGY

## 2018-01-25 PROCEDURE — 36000070 ZZH SURGERY LEVEL 5 EA 15 ADDTL MIN - UMMC: Performed by: SURGERY

## 2018-01-25 PROCEDURE — 25000128 H RX IP 250 OP 636: Performed by: ANESTHESIOLOGY

## 2018-01-25 PROCEDURE — 40000014 ZZH STATISTIC ARTERIAL MONITORING DAILY

## 2018-01-25 PROCEDURE — 25000128 H RX IP 250 OP 636: Performed by: SURGERY

## 2018-01-25 PROCEDURE — 0FT40ZZ RESECTION OF GALLBLADDER, OPEN APPROACH: ICD-10-PCS | Performed by: SURGERY

## 2018-01-25 PROCEDURE — 12000008 ZZH R&B INTERMEDIATE UMMC

## 2018-01-25 PROCEDURE — 25000125 ZZHC RX 250: Performed by: ANESTHESIOLOGY

## 2018-01-25 PROCEDURE — 82330 ASSAY OF CALCIUM: CPT | Performed by: ANESTHESIOLOGY

## 2018-01-25 PROCEDURE — C9290 INJ, BUPIVACAINE LIPOSOME: HCPCS | Performed by: STUDENT IN AN ORGANIZED HEALTH CARE EDUCATION/TRAINING PROGRAM

## 2018-01-25 PROCEDURE — 85610 PROTHROMBIN TIME: CPT | Performed by: SURGERY

## 2018-01-25 PROCEDURE — 82947 ASSAY GLUCOSE BLOOD QUANT: CPT | Performed by: ANESTHESIOLOGY

## 2018-01-25 PROCEDURE — 86850 RBC ANTIBODY SCREEN: CPT | Performed by: ANESTHESIOLOGY

## 2018-01-25 PROCEDURE — 80048 BASIC METABOLIC PNL TOTAL CA: CPT | Performed by: SURGERY

## 2018-01-25 PROCEDURE — 25000132 ZZH RX MED GY IP 250 OP 250 PS 637: Performed by: STUDENT IN AN ORGANIZED HEALTH CARE EDUCATION/TRAINING PROGRAM

## 2018-01-25 PROCEDURE — 25000125 ZZHC RX 250: Performed by: STUDENT IN AN ORGANIZED HEALTH CARE EDUCATION/TRAINING PROGRAM

## 2018-01-25 PROCEDURE — 25000125 ZZHC RX 250: Performed by: NURSE ANESTHETIST, CERTIFIED REGISTERED

## 2018-01-25 PROCEDURE — 25000128 H RX IP 250 OP 636: Performed by: NURSE ANESTHETIST, CERTIFIED REGISTERED

## 2018-01-25 PROCEDURE — 37000008 ZZH ANESTHESIA TECHNICAL FEE, 1ST 30 MIN: Performed by: SURGERY

## 2018-01-25 PROCEDURE — 83735 ASSAY OF MAGNESIUM: CPT | Performed by: SURGERY

## 2018-01-25 PROCEDURE — 85018 HEMOGLOBIN: CPT | Performed by: ANESTHESIOLOGY

## 2018-01-25 PROCEDURE — 25000565 ZZH ISOFLURANE, EA 15 MIN: Performed by: SURGERY

## 2018-01-25 PROCEDURE — 36415 COLL VENOUS BLD VENIPUNCTURE: CPT | Performed by: SURGERY

## 2018-01-25 PROCEDURE — 36000068 ZZH SURGERY LEVEL 5 1ST 30 MIN - UMMC: Performed by: SURGERY

## 2018-01-25 PROCEDURE — 71000016 ZZH RECOVERY PHASE 1 LEVEL 3 FIRST HR: Performed by: SURGERY

## 2018-01-25 PROCEDURE — 84295 ASSAY OF SERUM SODIUM: CPT | Performed by: ANESTHESIOLOGY

## 2018-01-25 PROCEDURE — 86901 BLOOD TYPING SEROLOGIC RH(D): CPT | Performed by: ANESTHESIOLOGY

## 2018-01-25 PROCEDURE — A9270 NON-COVERED ITEM OR SERVICE: HCPCS | Mod: GY | Performed by: ANESTHESIOLOGY

## 2018-01-25 PROCEDURE — 27210794 ZZH OR GENERAL SUPPLY STERILE: Performed by: SURGERY

## 2018-01-25 PROCEDURE — 88304 TISSUE EXAM BY PATHOLOGIST: CPT | Performed by: SURGERY

## 2018-01-25 PROCEDURE — 85025 COMPLETE CBC W/AUTO DIFF WBC: CPT | Performed by: SURGERY

## 2018-01-25 PROCEDURE — 25000125 ZZHC RX 250: Performed by: SURGERY

## 2018-01-25 PROCEDURE — 00000146 ZZHCL STATISTIC GLUCOSE BY METER IP

## 2018-01-25 PROCEDURE — 0FB10ZZ EXCISION OF RIGHT LOBE LIVER, OPEN APPROACH: ICD-10-PCS | Performed by: SURGERY

## 2018-01-25 PROCEDURE — C9399 UNCLASSIFIED DRUGS OR BIOLOG: HCPCS | Performed by: NURSE ANESTHETIST, CERTIFIED REGISTERED

## 2018-01-25 PROCEDURE — 27210995 ZZH RX 272: Performed by: SURGERY

## 2018-01-25 PROCEDURE — 71000017 ZZH RECOVERY PHASE 1 LEVEL 3 EA ADDTL HR: Performed by: SURGERY

## 2018-01-25 PROCEDURE — 40000171 ZZH STATISTIC PRE-PROCEDURE ASSESSMENT III: Performed by: SURGERY

## 2018-01-25 PROCEDURE — 25000132 ZZH RX MED GY IP 250 OP 250 PS 637: Mod: GY | Performed by: ANESTHESIOLOGY

## 2018-01-25 PROCEDURE — 84132 ASSAY OF SERUM POTASSIUM: CPT | Performed by: ANESTHESIOLOGY

## 2018-01-25 PROCEDURE — 86900 BLOOD TYPING SEROLOGIC ABO: CPT | Performed by: ANESTHESIOLOGY

## 2018-01-25 PROCEDURE — 40000275 ZZH STATISTIC RCP TIME EA 10 MIN

## 2018-01-25 PROCEDURE — 37000009 ZZH ANESTHESIA TECHNICAL FEE, EACH ADDTL 15 MIN: Performed by: SURGERY

## 2018-01-25 PROCEDURE — 82565 ASSAY OF CREATININE: CPT | Performed by: ANESTHESIOLOGY

## 2018-01-25 PROCEDURE — T1013 SIGN LANG/ORAL INTERPRETER: HCPCS | Mod: U3

## 2018-01-25 PROCEDURE — 85049 AUTOMATED PLATELET COUNT: CPT | Performed by: ANESTHESIOLOGY

## 2018-01-25 PROCEDURE — 80076 HEPATIC FUNCTION PANEL: CPT | Performed by: SURGERY

## 2018-01-25 RX ORDER — ACETAMINOPHEN 10 MG/ML
1000 INJECTION, SOLUTION INTRAVENOUS ONCE
Status: COMPLETED | OUTPATIENT
Start: 2018-01-25 | End: 2018-01-25

## 2018-01-25 RX ORDER — CEFAZOLIN SODIUM 1 G/3ML
1 INJECTION, POWDER, FOR SOLUTION INTRAMUSCULAR; INTRAVENOUS SEE ADMIN INSTRUCTIONS
Status: DISCONTINUED | OUTPATIENT
Start: 2018-01-25 | End: 2018-01-25 | Stop reason: HOSPADM

## 2018-01-25 RX ORDER — NICOTINE POLACRILEX 4 MG
15-30 LOZENGE BUCCAL
Status: DISCONTINUED | OUTPATIENT
Start: 2018-01-25 | End: 2018-01-30 | Stop reason: HOSPADM

## 2018-01-25 RX ORDER — MEPERIDINE HYDROCHLORIDE 50 MG/ML
12.5 INJECTION INTRAMUSCULAR; INTRAVENOUS; SUBCUTANEOUS
Status: DISCONTINUED | OUTPATIENT
Start: 2018-01-25 | End: 2018-01-25 | Stop reason: HOSPADM

## 2018-01-25 RX ORDER — PROPOFOL 10 MG/ML
INJECTION, EMULSION INTRAVENOUS PRN
Status: DISCONTINUED | OUTPATIENT
Start: 2018-01-25 | End: 2018-01-25

## 2018-01-25 RX ORDER — LIDOCAINE 40 MG/G
CREAM TOPICAL
Status: DISCONTINUED | OUTPATIENT
Start: 2018-01-25 | End: 2018-01-25 | Stop reason: HOSPADM

## 2018-01-25 RX ORDER — MAGNESIUM HYDROXIDE 1200 MG/15ML
LIQUID ORAL PRN
Status: DISCONTINUED | OUTPATIENT
Start: 2018-01-25 | End: 2018-01-25 | Stop reason: HOSPADM

## 2018-01-25 RX ORDER — NALOXONE HYDROCHLORIDE 0.4 MG/ML
.1-.4 INJECTION, SOLUTION INTRAMUSCULAR; INTRAVENOUS; SUBCUTANEOUS
Status: DISCONTINUED | OUTPATIENT
Start: 2018-01-25 | End: 2018-01-30 | Stop reason: HOSPADM

## 2018-01-25 RX ORDER — ONDANSETRON 2 MG/ML
INJECTION INTRAMUSCULAR; INTRAVENOUS PRN
Status: DISCONTINUED | OUTPATIENT
Start: 2018-01-25 | End: 2018-01-25

## 2018-01-25 RX ORDER — SODIUM CHLORIDE, SODIUM LACTATE, POTASSIUM CHLORIDE, CALCIUM CHLORIDE 600; 310; 30; 20 MG/100ML; MG/100ML; MG/100ML; MG/100ML
INJECTION, SOLUTION INTRAVENOUS CONTINUOUS
Status: DISCONTINUED | OUTPATIENT
Start: 2018-01-25 | End: 2018-01-25 | Stop reason: HOSPADM

## 2018-01-25 RX ORDER — ONDANSETRON 4 MG/1
4 TABLET, ORALLY DISINTEGRATING ORAL EVERY 6 HOURS PRN
Status: DISCONTINUED | OUTPATIENT
Start: 2018-01-25 | End: 2018-01-30 | Stop reason: HOSPADM

## 2018-01-25 RX ORDER — GABAPENTIN 300 MG/1
300 CAPSULE ORAL ONCE
Status: COMPLETED | OUTPATIENT
Start: 2018-01-25 | End: 2018-01-25

## 2018-01-25 RX ORDER — FLUMAZENIL 0.1 MG/ML
0.2 INJECTION, SOLUTION INTRAVENOUS
Status: DISCONTINUED | OUTPATIENT
Start: 2018-01-25 | End: 2018-01-25 | Stop reason: HOSPADM

## 2018-01-25 RX ORDER — FENTANYL CITRATE 50 UG/ML
25-50 INJECTION, SOLUTION INTRAMUSCULAR; INTRAVENOUS
Status: DISCONTINUED | OUTPATIENT
Start: 2018-01-25 | End: 2018-01-25 | Stop reason: HOSPADM

## 2018-01-25 RX ORDER — PROCHLORPERAZINE MALEATE 5 MG
10 TABLET ORAL EVERY 6 HOURS PRN
Status: DISCONTINUED | OUTPATIENT
Start: 2018-01-25 | End: 2018-01-30 | Stop reason: HOSPADM

## 2018-01-25 RX ORDER — LIDOCAINE HYDROCHLORIDE 20 MG/ML
INJECTION, SOLUTION INFILTRATION; PERINEURAL PRN
Status: DISCONTINUED | OUTPATIENT
Start: 2018-01-25 | End: 2018-01-25

## 2018-01-25 RX ORDER — ONDANSETRON 2 MG/ML
4 INJECTION INTRAMUSCULAR; INTRAVENOUS EVERY 6 HOURS PRN
Status: DISCONTINUED | OUTPATIENT
Start: 2018-01-25 | End: 2018-01-30 | Stop reason: HOSPADM

## 2018-01-25 RX ORDER — KETAMINE HYDROCHLORIDE 10 MG/ML
5 INJECTION, SOLUTION INTRAMUSCULAR; INTRAVENOUS EVERY 10 MIN PRN
Status: DISCONTINUED | OUTPATIENT
Start: 2018-01-25 | End: 2018-01-25 | Stop reason: HOSPADM

## 2018-01-25 RX ORDER — NALOXONE HYDROCHLORIDE 0.4 MG/ML
.1-.4 INJECTION, SOLUTION INTRAMUSCULAR; INTRAVENOUS; SUBCUTANEOUS
Status: DISCONTINUED | OUTPATIENT
Start: 2018-01-25 | End: 2018-01-25 | Stop reason: HOSPADM

## 2018-01-25 RX ORDER — DEXAMETHASONE SODIUM PHOSPHATE 10 MG/ML
INJECTION, SOLUTION INTRAMUSCULAR; INTRAVENOUS PRN
Status: DISCONTINUED | OUTPATIENT
Start: 2018-01-25 | End: 2018-01-25

## 2018-01-25 RX ORDER — ACETAMINOPHEN 325 MG/1
975 TABLET ORAL ONCE
Status: COMPLETED | OUTPATIENT
Start: 2018-01-25 | End: 2018-01-25

## 2018-01-25 RX ORDER — SODIUM CHLORIDE, SODIUM LACTATE, POTASSIUM CHLORIDE, CALCIUM CHLORIDE 600; 310; 30; 20 MG/100ML; MG/100ML; MG/100ML; MG/100ML
INJECTION, SOLUTION INTRAVENOUS CONTINUOUS
Status: DISCONTINUED | OUTPATIENT
Start: 2018-01-25 | End: 2018-01-26

## 2018-01-25 RX ORDER — BUPIVACAINE HYDROCHLORIDE 2.5 MG/ML
INJECTION, SOLUTION EPIDURAL; INFILTRATION; INTRACAUDAL PRN
Status: DISCONTINUED | OUTPATIENT
Start: 2018-01-25 | End: 2018-01-25

## 2018-01-25 RX ORDER — FENTANYL CITRATE 50 UG/ML
25-50 INJECTION, SOLUTION INTRAMUSCULAR; INTRAVENOUS EVERY 5 MIN PRN
Status: DISCONTINUED | OUTPATIENT
Start: 2018-01-25 | End: 2018-01-25 | Stop reason: HOSPADM

## 2018-01-25 RX ORDER — LIDOCAINE 40 MG/G
CREAM TOPICAL
Status: DISCONTINUED | OUTPATIENT
Start: 2018-01-25 | End: 2018-01-30 | Stop reason: HOSPADM

## 2018-01-25 RX ORDER — ONDANSETRON 2 MG/ML
4 INJECTION INTRAMUSCULAR; INTRAVENOUS EVERY 30 MIN PRN
Status: DISCONTINUED | OUTPATIENT
Start: 2018-01-25 | End: 2018-01-25 | Stop reason: HOSPADM

## 2018-01-25 RX ORDER — TENOFOVIR DISOPROXIL FUMARATE 300 MG/1
300 TABLET, FILM COATED ORAL DAILY
Status: DISCONTINUED | OUTPATIENT
Start: 2018-01-26 | End: 2018-01-30 | Stop reason: HOSPADM

## 2018-01-25 RX ORDER — ACETAMINOPHEN 325 MG/1
975 TABLET ORAL 3 TIMES DAILY PRN
Status: DISCONTINUED | OUTPATIENT
Start: 2018-01-25 | End: 2018-01-26

## 2018-01-25 RX ORDER — CEFAZOLIN SODIUM 2 G/100ML
2 INJECTION, SOLUTION INTRAVENOUS
Status: COMPLETED | OUTPATIENT
Start: 2018-01-25 | End: 2018-01-25

## 2018-01-25 RX ORDER — DEXTROSE MONOHYDRATE 25 G/50ML
25-50 INJECTION, SOLUTION INTRAVENOUS
Status: DISCONTINUED | OUTPATIENT
Start: 2018-01-25 | End: 2018-01-30 | Stop reason: HOSPADM

## 2018-01-25 RX ORDER — ONDANSETRON 4 MG/1
4 TABLET, ORALLY DISINTEGRATING ORAL EVERY 30 MIN PRN
Status: DISCONTINUED | OUTPATIENT
Start: 2018-01-25 | End: 2018-01-25 | Stop reason: HOSPADM

## 2018-01-25 RX ORDER — NITROGLYCERIN 10 MG/100ML
INJECTION INTRAVENOUS PRN
Status: DISCONTINUED | OUTPATIENT
Start: 2018-01-25 | End: 2018-01-25

## 2018-01-25 RX ORDER — ATORVASTATIN CALCIUM 40 MG/1
40 TABLET, FILM COATED ORAL DAILY
Status: DISCONTINUED | OUTPATIENT
Start: 2018-01-26 | End: 2018-01-30 | Stop reason: HOSPADM

## 2018-01-25 RX ADMIN — SUGAMMADEX 130 MG: 100 INJECTION, SOLUTION INTRAVENOUS at 14:00

## 2018-01-25 RX ADMIN — ONDANSETRON 4 MG: 2 INJECTION INTRAMUSCULAR; INTRAVENOUS at 14:00

## 2018-01-25 RX ADMIN — NITROGLYCERIN 50 MCG: 10 INJECTION INTRAVENOUS at 11:41

## 2018-01-25 RX ADMIN — MIDAZOLAM 0.5 MG: 1 INJECTION INTRAMUSCULAR; INTRAVENOUS at 16:24

## 2018-01-25 RX ADMIN — NITROGLYCERIN 25 MCG: 10 INJECTION INTRAVENOUS at 11:35

## 2018-01-25 RX ADMIN — PHENYLEPHRINE HYDROCHLORIDE 100 MCG: 10 INJECTION, SOLUTION INTRAMUSCULAR; INTRAVENOUS; SUBCUTANEOUS at 12:06

## 2018-01-25 RX ADMIN — SODIUM CHLORIDE, POTASSIUM CHLORIDE, SODIUM LACTATE AND CALCIUM CHLORIDE: 600; 310; 30; 20 INJECTION, SOLUTION INTRAVENOUS at 12:54

## 2018-01-25 RX ADMIN — DEXAMETHASONE SODIUM PHOSPHATE 8 MG: 10 INJECTION, SOLUTION INTRAMUSCULAR; INTRAVENOUS at 08:40

## 2018-01-25 RX ADMIN — PHENYLEPHRINE HYDROCHLORIDE 100 MCG: 10 INJECTION, SOLUTION INTRAMUSCULAR; INTRAVENOUS; SUBCUTANEOUS at 12:05

## 2018-01-25 RX ADMIN — PROCHLORPERAZINE EDISYLATE 10 MG: 5 INJECTION INTRAMUSCULAR; INTRAVENOUS at 20:30

## 2018-01-25 RX ADMIN — GABAPENTIN 300 MG: 300 CAPSULE ORAL at 07:34

## 2018-01-25 RX ADMIN — PHENYLEPHRINE HYDROCHLORIDE 100 MCG: 10 INJECTION, SOLUTION INTRAMUSCULAR; INTRAVENOUS; SUBCUTANEOUS at 08:47

## 2018-01-25 RX ADMIN — ROCURONIUM BROMIDE 15 MG: 10 INJECTION INTRAVENOUS at 11:28

## 2018-01-25 RX ADMIN — PHENYLEPHRINE HYDROCHLORIDE 100 MCG: 10 INJECTION, SOLUTION INTRAMUSCULAR; INTRAVENOUS; SUBCUTANEOUS at 12:07

## 2018-01-25 RX ADMIN — NITROGLYCERIN 25 MCG: 10 INJECTION INTRAVENOUS at 11:27

## 2018-01-25 RX ADMIN — HUMAN INSULIN 3.5 UNITS/HR: 100 INJECTION, SOLUTION SUBCUTANEOUS at 11:36

## 2018-01-25 RX ADMIN — HYDROMORPHONE HYDROCHLORIDE 0.4 MG: 1 INJECTION, SOLUTION INTRAMUSCULAR; INTRAVENOUS; SUBCUTANEOUS at 15:47

## 2018-01-25 RX ADMIN — ACETAMINOPHEN 975 MG: 325 TABLET, FILM COATED ORAL at 23:41

## 2018-01-25 RX ADMIN — HYDROMORPHONE HYDROCHLORIDE 0.3 MG: 1 INJECTION, SOLUTION INTRAMUSCULAR; INTRAVENOUS; SUBCUTANEOUS at 15:23

## 2018-01-25 RX ADMIN — MIDAZOLAM 1 MG: 1 INJECTION INTRAMUSCULAR; INTRAVENOUS at 08:10

## 2018-01-25 RX ADMIN — PHYTONADIONE 5 MG: 10 INJECTION, EMULSION INTRAMUSCULAR; INTRAVENOUS; SUBCUTANEOUS at 15:26

## 2018-01-25 RX ADMIN — ACETAMINOPHEN 1000 MG: 10 INJECTION, SOLUTION INTRAVENOUS at 15:50

## 2018-01-25 RX ADMIN — PHENYLEPHRINE HYDROCHLORIDE 100 MCG: 10 INJECTION, SOLUTION INTRAMUSCULAR; INTRAVENOUS; SUBCUTANEOUS at 12:04

## 2018-01-25 RX ADMIN — FENTANYL CITRATE 50 MCG: 50 INJECTION, SOLUTION INTRAMUSCULAR; INTRAVENOUS at 10:19

## 2018-01-25 RX ADMIN — BUPIVACAINE HYDROCHLORIDE 20 ML: 2.5 INJECTION, SOLUTION EPIDURAL; INFILTRATION; INTRACAUDAL at 08:15

## 2018-01-25 RX ADMIN — PHENYLEPHRINE HYDROCHLORIDE 200 MCG: 10 INJECTION, SOLUTION INTRAMUSCULAR; INTRAVENOUS; SUBCUTANEOUS at 08:44

## 2018-01-25 RX ADMIN — BUPIVACAINE 20 ML: 13.3 INJECTION, SUSPENSION, LIPOSOMAL INFILTRATION at 08:15

## 2018-01-25 RX ADMIN — CEFAZOLIN SODIUM 1 G: 2 INJECTION, SOLUTION INTRAVENOUS at 13:00

## 2018-01-25 RX ADMIN — FENTANYL CITRATE 75 MCG: 50 INJECTION, SOLUTION INTRAMUSCULAR; INTRAVENOUS at 08:38

## 2018-01-25 RX ADMIN — ACETAMINOPHEN 975 MG: 325 TABLET, FILM COATED ORAL at 07:34

## 2018-01-25 RX ADMIN — PHENYLEPHRINE HYDROCHLORIDE 100 MCG: 10 INJECTION, SOLUTION INTRAMUSCULAR; INTRAVENOUS; SUBCUTANEOUS at 08:50

## 2018-01-25 RX ADMIN — ENOXAPARIN SODIUM 40 MG: 40 INJECTION SUBCUTANEOUS at 08:22

## 2018-01-25 RX ADMIN — FENTANYL CITRATE 100 MCG: 50 INJECTION, SOLUTION INTRAMUSCULAR; INTRAVENOUS at 13:31

## 2018-01-25 RX ADMIN — PHENYLEPHRINE HYDROCHLORIDE 100 MCG: 10 INJECTION, SOLUTION INTRAMUSCULAR; INTRAVENOUS; SUBCUTANEOUS at 12:08

## 2018-01-25 RX ADMIN — ONDANSETRON 4 MG: 2 INJECTION INTRAMUSCULAR; INTRAVENOUS at 15:36

## 2018-01-25 RX ADMIN — PHENYLEPHRINE HYDROCHLORIDE 100 MCG: 10 INJECTION, SOLUTION INTRAMUSCULAR; INTRAVENOUS; SUBCUTANEOUS at 12:02

## 2018-01-25 RX ADMIN — SODIUM CHLORIDE, POTASSIUM CHLORIDE, SODIUM LACTATE AND CALCIUM CHLORIDE: 600; 310; 30; 20 INJECTION, SOLUTION INTRAVENOUS at 15:12

## 2018-01-25 RX ADMIN — FENTANYL CITRATE 50 MCG: 50 INJECTION, SOLUTION INTRAMUSCULAR; INTRAVENOUS at 12:42

## 2018-01-25 RX ADMIN — ROCURONIUM BROMIDE 20 MG: 10 INJECTION INTRAVENOUS at 10:19

## 2018-01-25 RX ADMIN — FAMOTIDINE 20 MG: 10 INJECTION, SOLUTION INTRAVENOUS at 18:23

## 2018-01-25 RX ADMIN — FENTANYL CITRATE 50 MCG: 50 INJECTION, SOLUTION INTRAMUSCULAR; INTRAVENOUS at 08:10

## 2018-01-25 RX ADMIN — SODIUM CHLORIDE, POTASSIUM CHLORIDE, SODIUM LACTATE AND CALCIUM CHLORIDE: 600; 310; 30; 20 INJECTION, SOLUTION INTRAVENOUS at 08:01

## 2018-01-25 RX ADMIN — MIDAZOLAM 1 MG: 1 INJECTION INTRAMUSCULAR; INTRAVENOUS at 08:46

## 2018-01-25 RX ADMIN — PHENYLEPHRINE HYDROCHLORIDE 100 MCG: 10 INJECTION, SOLUTION INTRAMUSCULAR; INTRAVENOUS; SUBCUTANEOUS at 11:13

## 2018-01-25 RX ADMIN — CEFAZOLIN SODIUM 2 G: 2 INJECTION, SOLUTION INTRAVENOUS at 09:03

## 2018-01-25 RX ADMIN — FENTANYL CITRATE 50 MCG: 50 INJECTION, SOLUTION INTRAMUSCULAR; INTRAVENOUS at 09:15

## 2018-01-25 RX ADMIN — Medication 5 MG: at 16:26

## 2018-01-25 RX ADMIN — FENTANYL CITRATE 50 MCG: 50 INJECTION, SOLUTION INTRAMUSCULAR; INTRAVENOUS at 13:15

## 2018-01-25 RX ADMIN — HYDROMORPHONE HYDROCHLORIDE 0.3 MG: 1 INJECTION, SOLUTION INTRAMUSCULAR; INTRAVENOUS; SUBCUTANEOUS at 15:31

## 2018-01-25 RX ADMIN — ROCURONIUM BROMIDE 20 MG: 10 INJECTION INTRAVENOUS at 11:03

## 2018-01-25 RX ADMIN — SODIUM CHLORIDE, POTASSIUM CHLORIDE, SODIUM LACTATE AND CALCIUM CHLORIDE: 600; 310; 30; 20 INJECTION, SOLUTION INTRAVENOUS at 23:40

## 2018-01-25 RX ADMIN — ROCURONIUM BROMIDE 30 MG: 10 INJECTION INTRAVENOUS at 09:21

## 2018-01-25 RX ADMIN — HYDROMORPHONE HYDROCHLORIDE 0.2 MG: 10 INJECTION, SOLUTION INTRAMUSCULAR; INTRAVENOUS; SUBCUTANEOUS at 15:43

## 2018-01-25 RX ADMIN — FENTANYL CITRATE 50 MCG: 50 INJECTION, SOLUTION INTRAMUSCULAR; INTRAVENOUS at 09:23

## 2018-01-25 RX ADMIN — FENTANYL CITRATE 25 MCG: 50 INJECTION, SOLUTION INTRAMUSCULAR; INTRAVENOUS at 09:12

## 2018-01-25 RX ADMIN — FENTANYL CITRATE 25 MCG: 50 INJECTION, SOLUTION INTRAMUSCULAR; INTRAVENOUS at 14:53

## 2018-01-25 RX ADMIN — ROCURONIUM BROMIDE 30 MG: 10 INJECTION INTRAVENOUS at 10:29

## 2018-01-25 RX ADMIN — PROPOFOL 180 MG: 10 INJECTION, EMULSION INTRAVENOUS at 08:38

## 2018-01-25 RX ADMIN — LIDOCAINE HYDROCHLORIDE 100 MG: 20 INJECTION, SOLUTION INFILTRATION; PERINEURAL at 08:38

## 2018-01-25 RX ADMIN — ROCURONIUM BROMIDE 50 MG: 10 INJECTION INTRAVENOUS at 08:39

## 2018-01-25 RX ADMIN — ROCURONIUM BROMIDE 20 MG: 10 INJECTION INTRAVENOUS at 12:48

## 2018-01-25 RX ADMIN — CEFAZOLIN SODIUM 1 G: 2 INJECTION, SOLUTION INTRAVENOUS at 11:03

## 2018-01-25 RX ADMIN — SODIUM CHLORIDE, POTASSIUM CHLORIDE, SODIUM LACTATE AND CALCIUM CHLORIDE: 600; 310; 30; 20 INJECTION, SOLUTION INTRAVENOUS at 08:29

## 2018-01-25 RX ADMIN — ROCURONIUM BROMIDE 10 MG: 10 INJECTION INTRAVENOUS at 12:03

## 2018-01-25 ASSESSMENT — PAIN DESCRIPTION - DESCRIPTORS: DESCRIPTORS: ACHING;CONSTANT

## 2018-01-25 NOTE — OR NURSING
Pt appears more comfortable, states his pain is better. VSS, ready to discharge to floor, awaiting RN to be available for report.

## 2018-01-25 NOTE — ANESTHESIA PROCEDURE NOTES
Arterial Line Procedure Note  Staff:     Anesthesiologist:  SUAD HOUGE  Location: In OR After Induction  Procedure Start/Stop Times:     patient identified, IV checked, site marked, risks and benefits discussed, informed consent, monitors and equipment checked, pre-op evaluation and at physician/surgeon's request      Correct Patient: Yes      Correct Position: Yes      Correct Site: Yes      Correct Procedure: Yes      Correct Laterality:  N/A    Site Marked:  N/A  Line Placement:     Procedure:  Arterial Line    Insertion Site:  Radial    Insertion laterality:  Left    Skin Prep: Chloraprep      Patient Prep: patient draped, mask, sterile gloves, hat and hand hygiene      Local skin infiltration:  None    Ultrasound Guided?: No      Catheter size:  20 gauge, 12 cm    Cath secured with: other (comment)      Cath secured with comment:  Benzoin tegaderm    Dressing:  Tegaderm    Complications:  None obvious    Arterial waveform: Yes      IBP within 10% of NIBP: Yes

## 2018-01-25 NOTE — OR NURSING
Discussed pain management with Dr Paz at bedside. Orders received for additional medications, ketamine and midazolam in small doses. Pt is somnolent, but complains of pain in RLQ of abdomen. Communicates via , able to speak some English.

## 2018-01-25 NOTE — ANESTHESIA POSTPROCEDURE EVALUATION
Patient: Ceasar Hui    Procedure(s):  Hand Assisted Laparoscopic Parital Right Hepatectomy with Intraoperative Ultrasound, Cholecystectomy   Anesthesia Block - Wound Class: I-Clean    Diagnosis:Hepatocellular Carcinoma   Diagnosis Additional Information: No value filed.    Anesthesia Type:  General, ETT    Note:  Anesthesia Post Evaluation    Patient location during evaluation: PACU  Patient participation: Able to fully participate in evaluation  Level of consciousness: awake and alert  Pain management: adequate  Airway patency: patent  Respiratory status: acceptable  Hydration status: acceptable  PONV: none     Anesthetic complications: None          Last vitals:  Vitals:    01/25/18 0825 01/25/18 1445 01/25/18 1500   BP:      Resp: 12 (P) 14 (P) 8   Temp:      SpO2: 100%           Electronically Signed By: Jaylon Paz MD  January 25, 2018  3:08 PM

## 2018-01-25 NOTE — ANESTHESIA CARE TRANSFER NOTE
Patient: Ceasar Hui    Procedure(s):  Hand Assisted Laparoscopic Parital Right Hepatectomy with Intraoperative Ultrasound, Cholecystectomy   Anesthesia Block - Wound Class: I-Clean    Diagnosis: Hepatocellular Carcinoma   Diagnosis Additional Information: No value filed.    Anesthesia Type:   General, ETT     Note:  Airway :Face Mask  Patient transferred to:PACU  Comments: VSS. Ventilating well.Handoff Report: Identifed the Patient, Identified the Reponsible Provider, Reviewed the pertinent medical history, Discussed the surgical course, Reviewed Intra-OP anesthesia mangement and issues during anesthesia, Set expectations for post-procedure period and Allowed opportunity for questions and acknowledgement of understanding      Vitals: (Last set prior to Anesthesia Care Transfer)    CRNA VITALS  1/25/2018 1357 - 1/25/2018 1438      1/25/2018             ART BP: 145/74    ART Mean: 104    Resp Rate (observed): (!)  4                Electronically Signed By: MARIA FERNANDA Miranda CRNA  January 25, 2018  2:38 PM

## 2018-01-25 NOTE — ANESTHESIA PREPROCEDURE EVALUATION
Anesthesia Evaluation     . Pt has had prior anesthetic.     No history of anesthetic complications          ROS/MED HX    ENT/Pulmonary:  - neg pulmonary ROS     Neurologic:  - neg neurologic ROS     Cardiovascular:     (+) Dyslipidemia, ----. : . . . :. .       METS/Exercise Tolerance:  >4 METS   Hematologic:  - neg hematologic  ROS       Musculoskeletal:  - neg musculoskeletal ROS       GI/Hepatic:     (+) GERD hepatitis type B, liver disease (HCC),       Renal/Genitourinary:         Endo:     (+) type II DM .      Psychiatric:     (+) psychiatric history depression      Infectious Disease:         Malignancy:   (+) Malignancy (HCC posterior right lobe 4.8cm) History of GI  GI CA Active status post,         Other:    - neg other ROS               Procedure: Procedure(s):  Laparoscopic Parital Right Hepatectomy with Intraoperative Ultrasound Anesthesia Block - Wound Class: I-Clean    HPI: Ceasar Hui is a 57 year old male with Hep B and HCC, but no other signs of liver disease and scheduled for above procedure.    PMHx/PSHx:  Past Medical History:   Diagnosis Date     Chronic infection      Depression      Diabetes (H)      Hepatitis B      Hepatocellular carcinoma (H)      Hyperlipidemia      Vitamin D deficiency        History reviewed. No pertinent surgical history.      No current facility-administered medications on file prior to encounter.   Current Outpatient Prescriptions on File Prior to Encounter:  atorvastatin (LIPITOR) 40 MG tablet Take 40 mg by mouth   tenofovir (VIREAD) 300 MG tablet Take 300 mg by mouth   metFORMIN (GLUCOPHAGE) 1000 MG tablet Take 1,000 mg by mouth   glyBURIDE (DIABETA /MICRONASE) 5 MG tablet Take 5 mg by mouth   Cholecalciferol (VITAMIN D3) 1000 UNITS CAPS Take 1,000 Units by mouth   simethicone (MYLICON) 125 MG CHEW chewable tablet Take 125 mg by mouth   Lancets MISC Testing blood sugars 2 time(s) a day. Pharmacist may substitute meter/supplies if insurance or patient requires  specific equipment or model   Glucose Blood (BLOOD GLUCOSE TEST STRIPS) STRP two times a day. Use as directed. Pharmacy dispense brand based on insurance.       Social Hx:   Social History   Substance Use Topics     Smoking status: Former Smoker     Types: Cigarettes     Quit date: 1/18/1998     Smokeless tobacco: Never Used     Alcohol use No       Allergies: No Known Allergies      NPO Status: Per ASA Guidelines    Labs:    Blood Bank:  No results found for: ABO, RH, AS  BMP:  No results for input(s): NA, POTASSIUM, CHLORIDE, CO2, BUN, CR, GLC, HOANG in the last 22862 hours.  CBC:   No results for input(s): WBC, RBC, HGB, HCT, MCV, MCH, MCHC, RDW, PLT in the last 99433 hours.  Coags:  No results for input(s): INR, PTT, FIBR in the last 13953 hours.      Physical Exam  Normal systems: dental    Airway   Mallampati: I  TM distance: >3 FB  Neck ROM: full    Dental     Cardiovascular   Rhythm and rate: regular and normal      Pulmonary    breath sounds clear to auscultation                    Anesthesia Plan      History & Physical Review  History and physical reviewed and following examination; no interval change.    ASA Status:  2 .    NPO Status:  > 6 hours    Plan for General and ETT with Intravenous and Propofol induction. Maintenance will be Balanced.    PONV prophylaxis:  Ondansetron (or other 5HT-3) and Dexamethasone or Solumedrol  Additional equipment: 2nd IV and Arterial Line (Usman-trac) - ASA 2  - GETA with standard ASA monitors, IV induction, balanced anesthetic  - PIV x2  - A-line with Usman-trac  - Antibiotics per surgery  - PONV prophylaxis: Decadron, Zofran  - Pain management with Fentanyl/dilaudid boluses, pre-op tylenol, gabapentin, QL blocks         Postoperative Care  Postoperative pain management:  IV analgesics, Oral pain medications and Peripheral nerve block (Single Shot).      Consents  Anesthetic plan, risks, benefits and alternatives discussed with:  Patient.  Use of blood products discussed:  Yes.   Use of blood products discussed with Patient.  Consented to blood products.  .                History and physical assessed; Patient examined.   Risks and alternatives presented and discussed. Patient and family agree. All questions answered.      Rj Engel MD  Staff Anesthesiologist  *[phone]

## 2018-01-25 NOTE — IP AVS SNAPSHOT
MRN:[id number]                      After Visit Summary   1/25/2018    Ceasar Hui    MRN: [id number]           Thank you!     Thank you for choosing [facility] for your care. Our goal is always to provide you with excellent care. Hearing back from our patients is one way we can continue to improve our services. Please take a few minutes to complete the written survey that you may receive in the mail after you visit with us. Thank you!        Patient Information     Date Of Birth          1960        Designated Caregiver       Most Recent Value    Caregiver    Will someone help with your care after discharge? yes    Name of designated caregiver Wife Desi     Phone number of caregiver Call viktor Banks [phone]    Caregiver address home       About your hospital stay     You were admitted on:  January 25, 2018 You last received care in the:  [address] [facility]    You were discharged on:  January 30, 2018        Reason for your hospital stay       Partial right hepatectomy                  Who to Call     For medical emergencies, please call 911.  For non-urgent questions about your medical care, please call your primary care provider or clinic, [phone]  For questions related to your surgery, please call your surgery clinic        Attending Provider     Provider Specialty    Esequiel Nunez MD Surgery       Primary Care Provider Office Phone # Fax #    [facility] [phone]      After Care Instructions     Activity       Your activity upon discharge: no lifting more than 15 lbs for 6 weeks.  Frequent ambulation            Diet       Follow this diet upon discharge: reguler            Discharge Instructions       If your travel plans upon discharge include prolonged periods of sitting (a lengthy car or plane ride), it is highly beneficial to get up and walk at least once per hour to help prevent swelling and blood clots.     You may shower after operation,  "let warm soapy water run over incision and pat dry. Do not submerge, soak, or scrub incision.    Take incentive spirometer home for continued frequent use    You will be discharged with narcotic pain medications. Please take them only as needed and do not operate a car or heavy machinery for 24 hours after taking them.  Narcotic pain medications like oxycodone are constipating. Please ensure that you are drinking adequate amounts of fluids and taking stool softeners while you are taking narcotics. Take Miralax as needed for constipation.     Do not drive until you can with stand pressing the brake pedal quickly and fully without pain and you are not distracted by pain.     Call for fever greater than 101.5, chills, red skin around incision, drainage from incision, increased swelling from the incision, bleeding from the incision that is not controlled with light pressure, inability to tolerate diet,new nausea/vomiting or other new/worsening symptoms.   You may also call the surgical oncology nurse care coordinator from 8:00am-4:30pm CARLOS Tyler at [phone]. For after hours questions or concerns you can contact the on-call surgical oncology resident (nights and weekends [phone] and ask \"I would like to page the Surgical Oncology Resident on call.\"). In emergencies, call [phone]    Follow Up:  Follow up in clinic with Dr. Nunez in 2 weeks. Follow up with primary care provider within 1 week. You should be called to make an appointment within 3 business days. If you are not contacted, call [phone] to make an appointment.                  Follow-up Appointments     Adult [facility] Follow-up and recommended labs and tests       Follow up within 1 week with primary care provider.   Follow up in 2 weeks with Dr. Nunez.     Appointments on [place] and/or [facility] (with [facility] or [facility] provider or service). Call [phone] if you haven't heard regarding these appointments within 7 days of discharge.   " "               Your next 10 appointments already scheduled     2018  9:00 AM CST   (Arrive by 8:45 AM)   Return Visit with Esequiel Nunez MD   [facility]    [address] [place] [address]  [address] [place] [id number] [phone]              Pending Results     Date and Time Order Name Status Description    2018 1123 EKG 12-lead, tracing only Preliminary     2018 1208 Surgical pathology exam In process             Statement of Approval     Ordered          18 1111  I have reviewed and agree with all the recommendations and orders detailed in this document.  EFFECTIVE NOW     Approved and electronically signed by:  Korin Martinez PA-C             Admission Information     Date & Time Provider Department Dept. Phone    2018 Esequiel Nunez MD [address] [facility] [phone]      Your Vitals Were     Blood Pressure Pulse Temperature Respirations Height Weight    127/78 (BP Location: Right arm) 111 97.9  F (36.6  C) (Oral) 16 1.702 m (5' 7\") 65.5 kg (144 lb 6.4 oz)    Pulse Oximetry BMI (Body Mass Index)                93% 22.62 kg/m2          [employer] Information     [employer] lets you send messages to your doctor, view your test results, renew your prescriptions, schedule appointments and more. To sign up, go to [email or web address] . Click on \"Log in\" on the left side of the screen, which will take you to the Welcome page. Then click on \"Sign up Now\" on the right side of the page.     You will be asked to enter the access code listed below, as well as some personal information. Please follow the directions to create your username and password.     Your access code is: [email or web address]  Expires: 2018  6:30 AM     Your access code will  in 90 days. If you need help or a new code, please call your [facility] clinic or [phone].        Care EveryWhere ID     This is your Care EveryWhere ID. This could be used " by other organizations to access your [facility] medical records  [id number]        Equal Access to Services     JAMA CASTELLANOS : Hadii minna Brumfield, rené salgado, becca duartemadavide quevedo, meghan gilliamcatrachitaalisa chamberlain. So [facility] [phone].    ATENCIÓN: Si habla español, tiene a angelo disposición servicios gratuitos de asistencia lingüística. Llame al [phone].    We comply with applicable federal civil rights laws and [place] laws. We do not discriminate on the basis of race, color, national origin, age, disability, sex, sexual orientation, or gender identity.               Review of your medicines      START taking        Dose / Directions    enoxaparin 40 MG/0.4ML injection   Commonly known as:  LOVENOX   Used for:  Deep vein thrombosis (DVT) prophylaxis prescribed at discharge        Dose:  40 mg   Inject 0.4 mLs (40 mg) Subcutaneous every 24 hours for 24 days   Quantity:  9.6 mL   Refills:  0       ibuprofen 600 MG tablet   Commonly known as:  ADVIL/MOTRIN   Used for:  Acute post-operative pain        Dose:  600 mg   Take 1 tablet (600 mg) by mouth every 6 hours as needed for moderate pain   Quantity:  50 tablet   Refills:  0       oxyCODONE IR 5 MG tablet   Commonly known as:  ROXICODONE        Dose:  5-10 mg   Take 1-2 tablets (5-10 mg) by mouth every 4 hours as needed for moderate to severe pain   Quantity:  30 tablet   Refills:  0       polyethylene glycol Packet   Commonly known as:  MIRALAX/GLYCOLAX   Used for:  Constipation, unspecified constipation type        Dose:  17 g   Take 17 g by mouth daily as needed for constipation   Quantity:  14 packet   Refills:  0         CONTINUE these medicines which have NOT CHANGED        Dose / Directions    atorvastatin 40 MG tablet   Commonly known as:  LIPITOR        Dose:  40 mg   Take 40 mg by mouth   Refills:  0       BLOOD GLUCOSE TEST STRIPS Strp        two times a day. Use as directed. Pharmacy dispense brand based on insurance.    Refills:  0       glyBURIDE 5 MG tablet   Commonly known as:  DIABETA /MICRONASE        Dose:  5 mg   Take 5 mg by mouth   Refills:  0       Lancets Misc        Testing blood sugars 2 time(s) a day. Pharmacist may substitute meter/supplies if insurance or patient requires specific equipment or model   Refills:  0       metFORMIN 1000 MG tablet   Commonly known as:  GLUCOPHAGE        Dose:  1000 mg   Take 1,000 mg by mouth   Refills:  0       simethicone 125 MG Chew chewable tablet   Commonly known as:  MYLICON        Dose:  125 mg   Take 125 mg by mouth   Refills:  0       tenofovir 300 MG tablet   Commonly known as:  VIREAD        Dose:  300 mg   Take 300 mg by mouth   Refills:  0       vitamin D3 1000 UNITS Caps        Dose:  1000 Units   Take 1,000 Units by mouth   Refills:  0            Where to get your medicines      These medications were sent to [facility] [place], [place] - [address] [address]     Phone:  [phone]     enoxaparin 40 MG/0.4ML injection    ibuprofen 600 MG tablet    polyethylene glycol Packet         Some of these will need a paper prescription and others can be bought over the counter. Ask your nurse if you have questions.     Bring a paper prescription for each of these medications     oxyCODONE IR 5 MG tablet                Protect others around you: Learn how to safely use, store and throw away your medicines at www.disposemymeds.org.        Information about OPIOIDS     PRESCRIPTION OPIOIDS: WHAT YOU NEED TO KNOW    Prescription opioids can be used to help relieve moderate to severe pain and are often prescribed following a surgery or injury, or for certain health conditions. These medications can be an important part of treatment but also come with serious risks. It is important to work with your health care provider to make sure you are getting the safest, most effective care.    WHAT ARE THE RISKS AND SIDE EFFECTS OF  OPIOID USE?  Prescription opioids carry serious risks of addiction and overdose, especially with prolonged use. An opioid overdose, often marked by slowed breathing can cause sudden death. The use of prescription opioids can have a number of side effects as well, even when taken as directed:      Tolerance - meaning you might need to take more of a medication for the same pain relief    Physical dependence - meaning you have symptoms of withdrawal when a medication is stopped    Increased sensitivity to pain    Constipation    Nausea, vomiting, and dry mouth    Sleepiness and dizziness    Confusion    Depression    Low levels of testosterone that can result in lower sex drive, energy, and strength    Itching and sweating    RISKS ARE GREATER WITH:    History of drug misuse, substance use disorder, or overdose    Mental health conditions (such as depression or anxiety)    Sleep apnea    Older age (65 years or older)    Pregnancy    Avoid alcohol while taking prescription opioids.   Also, unless specifically advised by your health care provider, medications to avoid include:    Benzodiazepines (such as Xanax or Valium)    Muscle relaxants (such as Soma or Flexeril)    Hypnotics (such as Ambien or Lunesta)    Other prescription opioids    KNOW YOUR OPTIONS:  Talk to your health care provider about ways to manage your pain that do not involve prescription opioids. Some of these options may actually work better and have fewer risks and side effects:    Pain relievers such as acetaminophen, ibuprofen, and naproxen    Some medications that are also used for depression or seizures    Physical therapy and exercise    Cognitive behavioral therapy, a psychological, goal-directed approach, in which patients learn how to modify physical, behavioral, and emotional triggers of pain and stress    IF YOU ARE PRESCRIBED OPIOIDS FOR PAIN:    Never take opioids in greater amounts or more often than prescribed    Follow up with your  primary health care provider and work together to create a plan on how to manage your pain.    Talk about ways to help manage your pain that do not involve prescription opioids    Talk about all concerns and side effects    Help prevent misuse and abuse    Never sell or share prescription opioids    Never use another person's prescription opioids    Store prescription opioids in a secure place and out of reach of others (this may include visitors, children, friends, and family)    Visit www.cdc.gov/drugoverdose to learn about risks of opioid abuse and overdose    If you believe you may be struggling with addiction, tell your health care provider and ask for guidance or call [facility]'s National Helpline at 7-784-293-HELP    LEARN MORE / www.cdc.gov/drugoverdose/prescribing/guideline.html    Safely dispose of unused prescription opioids: Find your local drug take-back programs and more information about the importance of safe disposal at www.doseofreality.[place].gov             Medication List: This is a list of all your medications and when to take them. Check marks below indicate your daily home schedule. Keep this list as a reference.      Medications           Morning Afternoon Evening Bedtime As Needed    atorvastatin 40 MG tablet   Commonly known as:  LIPITOR   Take 40 mg by mouth   Last time this was given:  40 mg on 1/30/2018  7:46 AM                                BLOOD GLUCOSE TEST STRIPS Strp   two times a day. Use as directed. Pharmacy dispense brand based on insurance.                                enoxaparin 40 MG/0.4ML injection   Commonly known as:  LOVENOX   Inject 0.4 mLs (40 mg) Subcutaneous every 24 hours for 24 days   Last time this was given:  40 mg on 1/29/2018 11:03 AM                                glyBURIDE 5 MG tablet   Commonly known as:  DIABETA /MICRONASE   Take 5 mg by mouth                                ibuprofen 600 MG tablet   Commonly known as:  ADVIL/MOTRIN   Take 1 tablet (600 mg) by  mouth every 6 hours as needed for moderate pain   Last time this was given:  600 mg on 1/30/2018 12:33 PM                                Lancets Misc   Testing blood sugars 2 time(s) a day. Pharmacist may substitute meter/supplies if insurance or patient requires specific equipment or model                                metFORMIN 1000 MG tablet   Commonly known as:  GLUCOPHAGE   Take 1,000 mg by mouth                                oxyCODONE IR 5 MG tablet   Commonly known as:  ROXICODONE   Take 1-2 tablets (5-10 mg) by mouth every 4 hours as needed for moderate to severe pain   Last time this was given:  10 mg on 1/30/2018 12:33 PM                                polyethylene glycol Packet   Commonly known as:  MIRALAX/GLYCOLAX   Take 17 g by mouth daily as needed for constipation                                simethicone 125 MG Chew chewable tablet   Commonly known as:  MYLICON   Take 125 mg by mouth                                tenofovir 300 MG tablet   Commonly known as:  VIREAD   Take 300 mg by mouth   Last time this was given:  300 mg on 1/30/2018  7:46 AM                                vitamin D3 1000 UNITS Caps   Take 1,000 Units by mouth

## 2018-01-25 NOTE — IP AVS SNAPSHOT
[address] [address]    Phone:  [phone]                                       After Visit Summary   1/25/2018    Ceasar Hui    MRN: [id number]           After Visit Summary Signature Page     I have received my discharge instructions, and my questions have been answered. I have discussed any challenges I see with this plan with the nurse or doctor.    ..........................................................................................................................................  Patient/Patient Representative Signature      ..........................................................................................................................................  Patient Representative Print Name and Relationship to Patient    ..................................................               ................................................  Date                                            Time    ..........................................................................................................................................  Reviewed by Signature/Title    ...................................................              ..............................................  Date                                                            Time

## 2018-01-25 NOTE — OR NURSING
Bilateral transverse abdominis pain blocks performed by Markell Lozano, Roselyn, and Esther. Pt tolerated well. Patient denies any adverse signs or symptoms. See flow sheet/MAR for monitoring.

## 2018-01-25 NOTE — ANESTHESIA PROCEDURE NOTES
Peripheral Nerve Block Procedure Note    Staff:     Anesthesiologist:  FUNMI HAYES    Resident/CRNA:  MAX KOCH    Block performed by resident/CRNA in the presence of a teaching physician    Location: Pre-op  Procedure Start/Stop TImes:      1/25/2018 8:10 AM     1/25/2018 8:20 AM    patient identified, IV checked, site marked, risks and benefits discussed, informed consent, monitors and equipment checked, pre-op evaluation, at physician/surgeon's request and post-op pain management      Correct Patient: Yes      Correct Position: Yes      Correct Site: Yes      Correct Procedure: Yes      Correct Laterality:  Yes    Site Marked:  Yes  Procedure details:     Procedure:  Quadratus lumborum    ASA:  2    Laterality:  Bilateral    Position:  Right Lateral Decubitus and Left Lateral Decubitus    Sterile Prep: chloraprep, mask and sterile gloves      Local skin infiltration:  1% lidocaine    amount (mL):  3    Needle:  Short bevel and insulated    Needle gauge:  21    Needle length (mm):  110    Abnormal pain on injection: No      Blood Aspirated: No      Paresthesias:  No    Bleeding at site: No      Bolus via:  Needle    Infusion Method:  Single Shot    Complications:  None  Assessment/Narrative:     Injection made incrementally with aspirations every (mL):  5       Bilateral Quadratus Lumborum Block

## 2018-01-25 NOTE — OR NURSING
"Dr. Engel  And Dr. Santana ok'd lovenox to be given post block. Patient is not knowledgeable in his diabetes care. IV retaped it was \"soar\" pt states it feels better now. Wife Desi does not understand english but Son erica is fluent. Report to Shi Corona.   "

## 2018-01-25 NOTE — BRIEF OP NOTE
[facility], [place]    Brief Operative Note    Pre-operative diagnosis: Hepatitis B, Hepatic fibrosis, Hepatocellular Carcinoma  Post-operative diagnosis Same  Procedure: Procedure(s):  Hand Assisted Laparoscopic Parital Right Hepatectomy with Intraoperative Ultrasound, Cholecystectomy   Anesthesia Block - Wound Class: I-Clean  Surgeon: Surgeon(s) and Role:     * Esequiel Nunez MD - Primary     * Gómez Carrington MD - Resident - Assisting  Anesthesia: Combined General with Block   Estimated blood loss: 250  Drains: None  Specimens:   ID Type Source Tests Collected by Time Destination   A : liver resection segment 6-7, ink and bread loaf Tissue Liver SURGICAL PATHOLOGY EXAM Esequiel Nunez MD 1/25/2018 11:52 AM    B :  Organ Gallbladder and Contents SURGICAL PATHOLOGY EXAM Esequiel Nunez MD 1/25/2018  1:15 PM      Findings:   Fibrotic liver, previously identified hepatic lesion in segments 6/7, otherwise normal anatomy  Complications: None.   Implants: None.

## 2018-01-26 ENCOUNTER — OFFICE VISIT (OUTPATIENT)
Dept: INTERPRETER SERVICES | Facility: CLINIC | Age: 58
End: 2018-01-26
Payer: COMMERCIAL

## 2018-01-26 ENCOUNTER — APPOINTMENT (OUTPATIENT)
Dept: OCCUPATIONAL THERAPY | Facility: CLINIC | Age: 58
DRG: 406 | End: 2018-01-26
Attending: SURGERY
Payer: COMMERCIAL

## 2018-01-26 ENCOUNTER — APPOINTMENT (OUTPATIENT)
Dept: GENERAL RADIOLOGY | Facility: CLINIC | Age: 58
DRG: 406 | End: 2018-01-26
Attending: SURGERY
Payer: COMMERCIAL

## 2018-01-26 ENCOUNTER — APPOINTMENT (OUTPATIENT)
Dept: PHYSICAL THERAPY | Facility: CLINIC | Age: 58
DRG: 406 | End: 2018-01-26
Attending: SURGERY
Payer: COMMERCIAL

## 2018-01-26 LAB
ALBUMIN SERPL-MCNC: 3.3 G/DL (ref 3.4–5)
ALP SERPL-CCNC: 57 U/L (ref 40–150)
ALT SERPL W P-5'-P-CCNC: 587 U/L (ref 0–70)
ANION GAP SERPL CALCULATED.3IONS-SCNC: 6 MMOL/L (ref 3–14)
ANION GAP SERPL CALCULATED.3IONS-SCNC: 7 MMOL/L (ref 3–14)
AST SERPL W P-5'-P-CCNC: 570 U/L (ref 0–45)
BASOPHILS # BLD AUTO: 0 10E9/L (ref 0–0.2)
BASOPHILS NFR BLD AUTO: 0.1 %
BILIRUB DIRECT SERPL-MCNC: 0.2 MG/DL (ref 0–0.2)
BILIRUB SERPL-MCNC: 0.9 MG/DL (ref 0.2–1.3)
BUN SERPL-MCNC: 11 MG/DL (ref 7–30)
BUN SERPL-MCNC: 13 MG/DL (ref 7–30)
CALCIUM SERPL-MCNC: 8.4 MG/DL (ref 8.5–10.1)
CALCIUM SERPL-MCNC: 8.5 MG/DL (ref 8.5–10.1)
CHLORIDE SERPL-SCNC: 98 MMOL/L (ref 94–109)
CHLORIDE SERPL-SCNC: 99 MMOL/L (ref 94–109)
CO2 SERPL-SCNC: 30 MMOL/L (ref 20–32)
CO2 SERPL-SCNC: 30 MMOL/L (ref 20–32)
CREAT SERPL-MCNC: 0.66 MG/DL (ref 0.66–1.25)
CREAT SERPL-MCNC: 0.72 MG/DL (ref 0.66–1.25)
DIFFERENTIAL METHOD BLD: ABNORMAL
EOSINOPHIL # BLD AUTO: 0 10E9/L (ref 0–0.7)
EOSINOPHIL NFR BLD AUTO: 0 %
ERYTHROCYTE [DISTWIDTH] IN BLOOD BY AUTOMATED COUNT: 14.1 % (ref 10–15)
ERYTHROCYTE [DISTWIDTH] IN BLOOD BY AUTOMATED COUNT: 14.2 % (ref 10–15)
GFR SERPL CREATININE-BSD FRML MDRD: >90 ML/MIN/1.7M2
GFR SERPL CREATININE-BSD FRML MDRD: >90 ML/MIN/1.7M2
GLUCOSE BLDC GLUCOMTR-MCNC: 151 MG/DL (ref 70–99)
GLUCOSE BLDC GLUCOMTR-MCNC: 156 MG/DL (ref 70–99)
GLUCOSE BLDC GLUCOMTR-MCNC: 159 MG/DL (ref 70–99)
GLUCOSE BLDC GLUCOMTR-MCNC: 169 MG/DL (ref 70–99)
GLUCOSE SERPL-MCNC: 143 MG/DL (ref 70–99)
GLUCOSE SERPL-MCNC: 176 MG/DL (ref 70–99)
HCT VFR BLD AUTO: 41.6 % (ref 40–53)
HCT VFR BLD AUTO: 42.6 % (ref 40–53)
HGB BLD-MCNC: 13 G/DL (ref 13.3–17.7)
HGB BLD-MCNC: 13.5 G/DL (ref 13.3–17.7)
IMM GRANULOCYTES # BLD: 0 10E9/L (ref 0–0.4)
IMM GRANULOCYTES NFR BLD: 0.3 %
INR PPP: 1.17 (ref 0.86–1.14)
LYMPHOCYTES # BLD AUTO: 1.9 10E9/L (ref 0.8–5.3)
LYMPHOCYTES NFR BLD AUTO: 13.4 %
MAGNESIUM SERPL-MCNC: 2 MG/DL (ref 1.6–2.3)
MAGNESIUM SERPL-MCNC: 2 MG/DL (ref 1.6–2.3)
MCH RBC QN AUTO: 26 PG (ref 26.5–33)
MCH RBC QN AUTO: 26.1 PG (ref 26.5–33)
MCHC RBC AUTO-ENTMCNC: 31.3 G/DL (ref 31.5–36.5)
MCHC RBC AUTO-ENTMCNC: 31.7 G/DL (ref 31.5–36.5)
MCV RBC AUTO: 82 FL (ref 78–100)
MCV RBC AUTO: 83 FL (ref 78–100)
MONOCYTES # BLD AUTO: 1.4 10E9/L (ref 0–1.3)
MONOCYTES NFR BLD AUTO: 9.9 %
NEUTROPHILS # BLD AUTO: 10.9 10E9/L (ref 1.6–8.3)
NEUTROPHILS NFR BLD AUTO: 76.3 %
NRBC # BLD AUTO: 0 10*3/UL
NRBC BLD AUTO-RTO: 0 /100
PLATELET # BLD AUTO: 132 10E9/L (ref 150–450)
PLATELET # BLD AUTO: 140 10E9/L (ref 150–450)
POTASSIUM SERPL-SCNC: 3.8 MMOL/L (ref 3.4–5.3)
POTASSIUM SERPL-SCNC: 4 MMOL/L (ref 3.4–5.3)
PROT SERPL-MCNC: 7.1 G/DL (ref 6.8–8.8)
RBC # BLD AUTO: 4.99 10E12/L (ref 4.4–5.9)
RBC # BLD AUTO: 5.19 10E12/L (ref 4.4–5.9)
SODIUM SERPL-SCNC: 135 MMOL/L (ref 133–144)
SODIUM SERPL-SCNC: 135 MMOL/L (ref 133–144)
TROPONIN I SERPL-MCNC: <0.015 UG/L (ref 0–0.04)
WBC # BLD AUTO: 14.2 10E9/L (ref 4–11)
WBC # BLD AUTO: 14.3 10E9/L (ref 4–11)

## 2018-01-26 PROCEDURE — T1013 SIGN LANG/ORAL INTERPRETER: HCPCS | Mod: U3

## 2018-01-26 PROCEDURE — 25000128 H RX IP 250 OP 636: Performed by: SURGERY

## 2018-01-26 PROCEDURE — 80076 HEPATIC FUNCTION PANEL: CPT | Performed by: SURGERY

## 2018-01-26 PROCEDURE — 25000131 ZZH RX MED GY IP 250 OP 636 PS 637: Performed by: SURGERY

## 2018-01-26 PROCEDURE — 97162 PT EVAL MOD COMPLEX 30 MIN: CPT | Mod: GP

## 2018-01-26 PROCEDURE — 82310 ASSAY OF CALCIUM: CPT | Performed by: SURGERY

## 2018-01-26 PROCEDURE — 40000193 ZZH STATISTIC PT WARD VISIT

## 2018-01-26 PROCEDURE — 40000133 ZZH STATISTIC OT WARD VISIT

## 2018-01-26 PROCEDURE — 12000008 ZZH R&B INTERMEDIATE UMMC

## 2018-01-26 PROCEDURE — 85027 COMPLETE CBC AUTOMATED: CPT | Performed by: STUDENT IN AN ORGANIZED HEALTH CARE EDUCATION/TRAINING PROGRAM

## 2018-01-26 PROCEDURE — 83735 ASSAY OF MAGNESIUM: CPT | Performed by: STUDENT IN AN ORGANIZED HEALTH CARE EDUCATION/TRAINING PROGRAM

## 2018-01-26 PROCEDURE — 80048 BASIC METABOLIC PNL TOTAL CA: CPT | Performed by: SURGERY

## 2018-01-26 PROCEDURE — 85610 PROTHROMBIN TIME: CPT | Performed by: SURGERY

## 2018-01-26 PROCEDURE — 93005 ELECTROCARDIOGRAM TRACING: CPT

## 2018-01-26 PROCEDURE — 97535 SELF CARE MNGMENT TRAINING: CPT | Mod: GO

## 2018-01-26 PROCEDURE — 84484 ASSAY OF TROPONIN QUANT: CPT | Performed by: STUDENT IN AN ORGANIZED HEALTH CARE EDUCATION/TRAINING PROGRAM

## 2018-01-26 PROCEDURE — 97530 THERAPEUTIC ACTIVITIES: CPT | Mod: GP

## 2018-01-26 PROCEDURE — 80048 BASIC METABOLIC PNL TOTAL CA: CPT | Performed by: STUDENT IN AN ORGANIZED HEALTH CARE EDUCATION/TRAINING PROGRAM

## 2018-01-26 PROCEDURE — 25000132 ZZH RX MED GY IP 250 OP 250 PS 637: Performed by: SURGERY

## 2018-01-26 PROCEDURE — 97165 OT EVAL LOW COMPLEX 30 MIN: CPT | Mod: GO

## 2018-01-26 PROCEDURE — 25000128 H RX IP 250 OP 636: Performed by: PHYSICIAN ASSISTANT

## 2018-01-26 PROCEDURE — 83735 ASSAY OF MAGNESIUM: CPT | Performed by: SURGERY

## 2018-01-26 PROCEDURE — 36415 COLL VENOUS BLD VENIPUNCTURE: CPT | Performed by: STUDENT IN AN ORGANIZED HEALTH CARE EDUCATION/TRAINING PROGRAM

## 2018-01-26 PROCEDURE — 85025 COMPLETE CBC W/AUTO DIFF WBC: CPT | Performed by: SURGERY

## 2018-01-26 PROCEDURE — 97116 GAIT TRAINING THERAPY: CPT | Mod: GP

## 2018-01-26 PROCEDURE — 36415 COLL VENOUS BLD VENIPUNCTURE: CPT | Performed by: SURGERY

## 2018-01-26 PROCEDURE — 93010 ELECTROCARDIOGRAM REPORT: CPT | Performed by: INTERNAL MEDICINE

## 2018-01-26 PROCEDURE — 71045 X-RAY EXAM CHEST 1 VIEW: CPT

## 2018-01-26 PROCEDURE — 00000146 ZZHCL STATISTIC GLUCOSE BY METER IP

## 2018-01-26 PROCEDURE — 25000125 ZZHC RX 250: Performed by: SURGERY

## 2018-01-26 PROCEDURE — 99231 SBSQ HOSP IP/OBS SF/LOW 25: CPT | Performed by: NURSE PRACTITIONER

## 2018-01-26 RX ORDER — DIPHENHYDRAMINE HCL 25 MG
25-50 CAPSULE ORAL EVERY 6 HOURS PRN
Status: DISCONTINUED | OUTPATIENT
Start: 2018-01-26 | End: 2018-01-30 | Stop reason: HOSPADM

## 2018-01-26 RX ORDER — SODIUM CHLORIDE, SODIUM LACTATE, POTASSIUM CHLORIDE, CALCIUM CHLORIDE 600; 310; 30; 20 MG/100ML; MG/100ML; MG/100ML; MG/100ML
INJECTION, SOLUTION INTRAVENOUS CONTINUOUS
Status: DISCONTINUED | OUTPATIENT
Start: 2018-01-26 | End: 2018-01-27

## 2018-01-26 RX ORDER — ACETAMINOPHEN 10 MG/ML
500 INJECTION, SOLUTION INTRAVENOUS EVERY 6 HOURS
Status: DISCONTINUED | OUTPATIENT
Start: 2018-01-26 | End: 2018-01-27

## 2018-01-26 RX ADMIN — ACETAMINOPHEN 500 MG: 10 INJECTION, SOLUTION INTRAVENOUS at 22:03

## 2018-01-26 RX ADMIN — SODIUM CHLORIDE, POTASSIUM CHLORIDE, SODIUM LACTATE AND CALCIUM CHLORIDE: 600; 310; 30; 20 INJECTION, SOLUTION INTRAVENOUS at 19:30

## 2018-01-26 RX ADMIN — ACETAMINOPHEN 500 MG: 10 INJECTION, SOLUTION INTRAVENOUS at 11:05

## 2018-01-26 RX ADMIN — ATORVASTATIN CALCIUM 40 MG: 40 TABLET, FILM COATED ORAL at 09:15

## 2018-01-26 RX ADMIN — ENOXAPARIN SODIUM 40 MG: 40 INJECTION SUBCUTANEOUS at 11:06

## 2018-01-26 RX ADMIN — TENOFOVIR DISOPROXIL FUMARATE 300 MG: 300 TABLET, COATED ORAL at 09:14

## 2018-01-26 RX ADMIN — RANITIDINE 150 MG: 150 TABLET, FILM COATED ORAL at 17:09

## 2018-01-26 RX ADMIN — FAMOTIDINE 20 MG: 10 INJECTION, SOLUTION INTRAVENOUS at 06:30

## 2018-01-26 RX ADMIN — SODIUM CHLORIDE, POTASSIUM CHLORIDE, SODIUM LACTATE AND CALCIUM CHLORIDE: 600; 310; 30; 20 INJECTION, SOLUTION INTRAVENOUS at 07:48

## 2018-01-26 RX ADMIN — INSULIN ASPART 1 UNITS: 100 INJECTION, SOLUTION INTRAVENOUS; SUBCUTANEOUS at 17:09

## 2018-01-26 RX ADMIN — ACETAMINOPHEN 500 MG: 10 INJECTION, SOLUTION INTRAVENOUS at 17:09

## 2018-01-26 RX ADMIN — ONDANSETRON 4 MG: 2 INJECTION INTRAMUSCULAR; INTRAVENOUS at 11:05

## 2018-01-26 RX ADMIN — DIPHENHYDRAMINE HYDROCHLORIDE 25 MG: 25 CAPSULE ORAL at 11:06

## 2018-01-26 RX ADMIN — HYDROMORPHONE HYDROCHLORIDE: 10 INJECTION, SOLUTION INTRAMUSCULAR; INTRAVENOUS; SUBCUTANEOUS at 13:38

## 2018-01-26 ASSESSMENT — PAIN DESCRIPTION - DESCRIPTORS
DESCRIPTORS: ACHING;CONSTANT
DESCRIPTORS: ACHING;SORE

## 2018-01-26 ASSESSMENT — ACTIVITIES OF DAILY LIVING (ADL): IADL_COMMENTS: FAMILY ABLE TO ASSIST

## 2018-01-26 NOTE — PLAN OF CARE
Problem: Patient Care Overview  Goal: Plan of Care/Patient Progress Review  Outcome: No Change  [place] speaking.  phone at the bedside. Patient able to understand some English. Patient's son helpful with some translating. Tachycardic in the 100s, otherwise VSS. Pain moderately managed with PCA Dilaudid, Tylenol and hot packs. NPO. Denies nausea. Lap sites and abdominal incision dermabond, CDI. Bowel sounds faint and hypoactive, denies passing gas. Good urine volumes via cade. Turning with assist of 1. Blood sugar checks 159 and 151. Continue with plan of care - encourage activity, monitor pain. Patient would like bedside report.

## 2018-01-26 NOTE — PROGRESS NOTES
01/26/18 1100   Quick Adds   Type of Visit Initial Occupational Therapy Evaluation   Living Environment   Lives With child(ivania), adult;spouse   Living Arrangements house   Home Accessibility bed and bath on same level;tub/shower is not walk in;stairs to enter home   Number of Stairs to Enter Home 6   Number of Stairs Within Home (house has basement, but pt does not use)   Stair Railings at Home other (see comments)  (3 steps w/c railing to porch, 3 steps with railing to door)   Transportation Available family or friend will provide   Living Environment Comment Pt lives in a house with his wife and 3 adult children who are able to assist him upon d/c.  Pt's bedroom and bathroom are on the main level.   Self-Care   Dominant Hand right   Usual Activity Tolerance good   Current Activity Tolerance fair   Regular Exercise no   Equipment Currently Used at Home none   Activity/Exercise/Self-Care Comment Pt reports he was very active with yardwork, shoveling.   Functional Level Prior   Ambulation 0-->independent   Transferring 0-->independent   Toileting 0-->independent   Bathing 0-->independent   Dressing 0-->independent   Eating 0-->independent   Communication 0-->understands/communicates without difficulty   Swallowing 0-->swallows foods/liquids without difficulty   Cognition 0 - no cognition issues reported   Fall history within last six months no   Which of the above functional risks had a recent onset or change? ambulation;transferring;toileting;bathing;dressing   Prior Functional Level Comment Pt IND with all functional mobility and ADL.  Pt is not currently working, on disability   General Information   Onset of Illness/Injury or Date of Surgery - Date 01/27/18   Referring Physician Gómez Carrington MD   Additional Occupational Profile Info/Pertinent History of Current Problem 57 year old male POD #1 s/p  LAPAROSCOPIC HEPATECTOMY PARTIAL for hepatocellular carcinoma.    Precautions/Limitations abdominal  precautions;fall precautions   Weight-Bearing Status - LUE (10# lifting restriction)   Weight-Bearing Status - RUE (10# lifting restriction)   Weight-Bearing Status - LLE full weight-bearing   Weight-Bearing Status - RLE full weight-bearing   Cognitive Status Examination   Level of Consciousness lethargic/somnolent   Cognitive Comment Pt somnolent during evaluation.  He follows verbal commands via  and responds to questions appropriately, information confirmed by son.  Pt's eyes closed for most of the time.  Pt denying any changes in his thinking except being very tired.   Visual Perception   Visual Perception Comments Pt denying any changes in vision since surgery   Sensory Examination   Sensory Comments Denies changes   Pain Assessment   Patient Currently in Pain Yes, see Vital Sign flowsheet   Integumentary/Edema   Integumentary/Edema Comments No concerns   Range of Motion (ROM)   ROM Comment WFL through B UE   Hand Strength   Hand Strength Comments WFL   Coordination   Coordination Comments Not tested, no concerns   Transfer Skills   Transfer Comments Pt declining d/t dizziness/nausea   Grooming   Level of [place]: Grooming stand-by assist   Instrumental Activities of Daily Living (IADL)   IADL Comments Family able to assist   Activities of Daily Living Analysis   Impairments Contributing to Impaired Activities of Daily Living flexibility decreased;post surgical precautions   General Therapy Interventions   Planned Therapy Interventions ADL retraining;strengthening;transfer training;progressive activity/exercise   Clinical Impression   Criteria for Skilled Therapeutic Interventions Met yes, treatment indicated   OT Diagnosis Decreases independence with ADL   Influenced by the following impairments Pain, post-surgical precautions, limited activity tolerance   Assessment of Occupational Performance 1-3 Performance Deficits   Identified Performance Deficits Dressing, bathing, transfers   Clinical  "Decision Making (Complexity) Low complexity   Therapy Frequency daily   Predicted Duration of Therapy Intervention (days/wks) 3 days   Anticipated Equipment Needs at Discharge shower chair   Anticipated Discharge Disposition Home with Assist   Risks and Benefits of Treatment have been explained. Yes   Patient, Family & other staff in agreement with plan of care Yes   Clinical Impression Comments Pt presents with decreased independence with ADL 2/2 decreased activity tolerance, pain, and post-surgical precautions.  Pt would benefit from skilled occupational therapy services for education in compensatory strategies and to improve activity tolerance for increased independence with ADL.   [facility] AM-PAC TM \"6 Clicks\"   2016, Trustees of [facility], under license to [employer].  All rights reserved.   6 Clicks Short Forms Daily Activity Inpatient Short Form   [facility] AM-PAC  \"6 Clicks\" Daily Activity Inpatient Short Form   1. Putting on and taking off regular lower body clothing? 2 - A Lot   2. Bathing (including washing, rinsing, drying)? 2 - A Lot   3. Toileting, which includes using toilet, bedpan or urinal? 3 - A Little   4. Putting on and taking off regular upper body clothing? 3 - A Little   5. Taking care of personal grooming such as brushing teeth? 4 - None   6. Eating meals? 4 - None   Daily Activity Raw Score (Score out of 24.Lower scores equate to lower levels of function) 18   Total Evaluation Time   Total Evaluation Time (Minutes) 10     "

## 2018-01-26 NOTE — PLAN OF CARE
"Problem: Patient Care Overview  Goal: Plan of Care/Patient Progress Review  Discharge Planner PT   Patient plan for discharge: Home with family  Current status: PT eval complete and treatment initiated. Pt educated on abdominal precautions. Transferred supine<>sit and sit<>stand with CGA and FWW. Pt amb 180 ft with FWW and CGA, slow but steady. VSS while on 1L of O2 throughout session. Pt reporting pain at surgical site and \"tightness\"  Barriers to return to prior living situation: below baseline activity tolerance, requires assist for all mobility, pain  Recommendations for discharge: Home with family  Rationale for recommendations: Pt will be safe to DC home with family who will be able to provide 24/7 if needed.       Entered by: Maribell Tompkins 01/26/2018 3:29 PM         "

## 2018-01-26 NOTE — PLAN OF CARE
Problem: Patient Care Overview  Goal: Plan of Care/Patient Progress Review  OT 7C: Evaluation completed, treatment initiated.  Discharge Planner OT   Patient plan for discharge: home with assist from family  Current status: Pt somnolent during evaluation, had just been sitting EOB with NST and was feeling dizzy/nauseous.  Pt educated in OT role and in abdominal precautions during ADL.  Pt following commands appropriately. Pt declining further activity d/t fatigue, dizziness, nausea.  Barriers to return to prior living situation: medical status, pain  Recommendations for discharge: Pt likely able to return home with assist from family pending progress in therapy  Rationale for recommendations:  to progress safety and independence with ADL       Entered by: Shanon Quezada 01/26/2018 3:43 PM

## 2018-01-26 NOTE — PROGRESS NOTES
"REGIONAL ANESTHESIA PAIN SERVICE  SUBJECTIVE: Interviewed using  phone service and patient responses. Patient states postop pain adequately controlled with controlled with PCA, analgesics and nerve block injections, rating diffuse abdominal pain 7/10 at rest.  denies nausea.  Denies any weakness, paresthesias, circumoral numbness, metallic taste or tinnitus.       Clinically Aligned Pain Assessment (CAPA):  Comfort (How is your pain?): Tolerable with discomfort  Change in Pain (Since your last medication/intervention?): About the same  Pain Control (How are your pain treatments working?):  Fully effective pain control    Medications related to Pain Management (Future)    Start     Dose/Rate Route Frequency Ordered Stop    01/29/18 0000  lidocaine 1 % 1 mL      1 mL Other EVERY 1 HOUR PRN 01/25/18 1737      01/26/18 1020  diphenhydrAMINE (BENADRYL) capsule 25-50 mg      25-50 mg Oral EVERY 6 HOURS PRN 01/26/18 1020      01/26/18 1015  acetaminophen (OFIRMEV) infusion 500 mg      500 mg  200 mL/hr over 15 Minutes Intravenous EVERY 6 HOURS 01/26/18 1002 01/28/18 1014    01/25/18 1748  bupivacaine liposome (EXPAREL) LONG ACTING injection was administered into the infiltration site to produce postsurgical analgesia. Duration of action is up to 72 hours, and other \"claudia\" medications should not be given for 96 hours with the exception of the lidocaine 5% patch (LIDODERM) and the lidocaine 10mg in potassium infusions. This entry is for INFORMATION ONLY.       Does not apply CONTINUOUS PRN 01/25/18 1748 01/29/18 1747    01/25/18 1737  lidocaine (LMX4) kit       Topical EVERY 1 HOUR PRN 01/25/18 1737      01/25/18 1500  HYDROmorphone (DILAUDID) PCA 1 mg/mL       Intravenous PCA 01/25/18 1448            OBJECTIVE:    Blood pressure 118/71, pulse 106, temperature 99.5  F (37.5  C), temperature source Oral, resp. rate 9, height 1.702 m (5' 7\"), weight 66.7 kg (147 lb 0.8 oz), SpO2 95 %.  Strength 5/5 and grossly " symmetric BLE       ASSESSMENT/PLAN:    Ceasar Hui is a 57 year old male POD #1 s/p  LAPAROSCOPIC HEPATECTOMY PARTIAL for hepatocellular carcinoma.  Bilateral single shot Quadratus Lumborum nerve block injections.  Total bupivacaine 0.25% 20 mL, then liposomal bupivacaine (Exparel) 1.3% 20 mL administered 1/25/18 for postop pain control.  No weakness or paresthesias.  No evidence of adverse side effects associated with nerve block injections.  Pt acheiving adequate pain control, with nerve block and IV analgesics.  Anticipate up to 72 hours of pain control with long-acting local anesthetic. Additionally, pt will continue to require opioid/nonopioid analgesics for visceral and muscle pain not controlled with local anesthetic.      - NO other local anesthetic use within 96 hours of liposomal bupivacaine (Exparel)  - patient received verbal and written instructions about liposomal bupivacaine and counseling about pharmacologic and nonpharmacologic measures for acute postoperative pain management  - please call if questions or concerns      MARIA FERNANDA Lang [facility]  Regional Anesthesia Pain Service  1/26/2018 12:55 PM    Contact Info (for in-house use only):  Job code ID: [id number]   [place] [facility] [address]  [facility] 0602  Jeff phone: dial [phone], enter jobcode ID, then enter call-back number.    Text: Use [employer] on the Intranet <Paging/Directory> tab and enter Jobcode ID.   If no call back at any time, contact the hospital  and ask for RAPS attending or backup

## 2018-01-26 NOTE — OP NOTE
Procedure Date: 01/25/2018      DATE OF PROCEDURE:  01/25/2018        SURGEON:  Esequiel Nunez MD      FIRST ASSISTANT:  Tim Carrington MD, PGY-5.      PREOPERATIVE DIAGNOSES:   1.  Hepatitis B related hepatic fibrosis.   2.  Hepatocellular carcinoma.      POSTOPERATIVE DIAGNOSES:     1.  Hepatitis B related hepatic fibrosis.   2.  Hepatocellular carcinoma.      PROCEDURES:   1.  Hand-assisted laparoscopic partial right hepatectomy with resection of liver segments 6 and 7.   2.  Laparoscopic cholecystectomy.   3.  Intraoperative hepatic ultrasound.      ANESTHESIA:  General.      ESTIMATED BLOOD LOSS:  250 mL.      SPECIMENS:   1.  Liver segments 6 and 7.   2.  Gallbladder.      DESCRIPTION OF PROCEDURE:  Mr. Hui was put to sleep by Anesthesia, prepped and draped sterilely.  Pause for cause was performed and was accurate.  We entered the abdomen using a small right subcostal incision for hand port placement.  We then insufflated the abdomen and placed additional trocars under direct visualization.  The peritoneal surfaces of the abdomen were inspected.  There was no evidence of peritoneal disease.  The liver itself had evidence of nodularity consistent with fibrosis.  The tissue was very firm and rubbery.  Intraoperative ultrasound was performed and we were able to identify the hepatocellular cancer in segment 7.  Additionally, the patient had numerous hyperechoic lesions consistent with some of the hypervascular lesions seen on MRI which were not definitively diagnosable but may represent regenerative nodules.  Several photographs were taken of the intraoperative ultrasound images to be placed in the patient's chart.  We began by fully mobilizing the right lobe of the liver.  We took down the falciform ligament to the level of the vena cava.  We identified both the vena cava and also the confluence of the right hepatic vein.  We then mobilized the patient's right lobe posteriorly.  Of note, the tumor was  extending into the retroperitoneum out of the posterior liver.  Because of this, we took a small portion of Gerota's fascia along with the tumor and also took the anterior capsule of the right adrenal, along with the posterior liver as well.  We freed up the liver to the vena cava.  We also took down multiple small hepatic caval branches.  There was a larger hepatic caval branch at the inferior liver which was clipped twice proximally and twice distally and then divided.  We then used ultrasound to plan our transection line through the liver with the plan of resecting segments 6 and 7.  The liver itself was extremely hard to move around because of the firm nature and we ended up having to use the gallbladder as a grasper to assist us with mobilization.  Once we had the surface of the liver marked for our planned transection line, we used microwave precoagulation to coagulate the liver along our transection line.  Once that was completed, we used the Harmonic scalpel to divide the superficial liver tissues and then a couple firings of a white load stapling device to divide one of the portal pedicles that was leading towards the tumor and the remaining liver parenchyma.  The specimen was removed.  It was inked and bread loafed in pathology and was at least 9 mm from the closest margin.  Satisfied with that, we corrected a few small bleeding points on the cut surface of the liver using the argon beam coagulator.  Similarly, the anterior portion of the right adrenal gland was bleeding and that was also corrected using argon beam coagulation, after which hemostasis was excellent.  There was no evidence of any bile leak at any time.  We then went ahead with a cholecystectomy which was very straightforward.  We identified the cystic ducts and artery without difficulty, clipped both structures twice proximally, once distally, and then divided and the gallbladder was removed from the fossa in an EndoCatch bag.  We irrigated  the abdomen with a liter of saline.  Hemostasis was excellent.  We resuscitated the patient and also performed a +30 Valsalva maneuver which did not prompt any bleeding.  Satisfied with that, we removed our ports and went ahead with closure.  The abdominal wall of the right subcostal incision was closed in 2 layers using 0 looped PDS.  Subcutaneous tissues were irrigated and all other skin incisions were closed using 4-0 Monocryl followed by Dermabond.  The patient tolerated the procedure well, was extubated in the Operating Room and transferred to Recovery Room in stable condition.  I was present throughout the entire procedure as described above.         LENY JONES MD             D: 2018   T: 2018   MT:       Name:     BRYSON GARVEY   MRN:      [id number]        Account:        [id number]   :      1960           Procedure Date: 2018      Document: [id number]

## 2018-01-26 NOTE — PROGRESS NOTES
"Post-Operative check: 12:04 AM 1/26/2018   Name: Ceasar uHi 57 year old male ID: [id number]      POD#0 s/p partial R hepatectomy, cholecystectomy for HCC and Hepatitis B.     S: Pt is alert and oriented, slightly sleepy. Pt states that pain is well controlled. Denies any headache, chest pain, SOB, Vomiting, numbness/tingling of the extremities. Used Camb[place]ian .    O:  /75 (BP Location: Right arm)  Pulse 102  Temp 98  F (36.7  C) (Oral)  Resp 11  Ht 1.702 m (5' 7\")  Wt 66.7 kg (147 lb 0.8 oz)  SpO2 96%  BMI 23.03 kg/m2  GEN: NAD  CV/PULM: Non labored on 3L NC, occasional shallow respirations  Abdomen: Soft, appropriately tender.  Incisions clean and dry with dermabond in place.  Extremities - no edema, non-tender, SCDs in place.    Lab Results   Component Value Date    HGB 14.1 01/25/2018    HGB 14.6 01/25/2018       A/P: 57 year old male who is s/p the above named procedure. Some concern about oversedation with PCA due to shallow respirations and low RR. Held PCA for a couple of hours and patient tolerated well with increasing RR to 10-12 range. Recovering well following surgery.     - Added PRN Tylenol  - Further management per primary team.     Fabrice Cowart, DO  General Surgery, PGY-1  Pg [phone]  "

## 2018-01-26 NOTE — PROGRESS NOTES
01/26/18 1453   Quick Adds   Type of Visit Initial PT Evaluation       Present yes   Living Environment   Lives With child(ivania), adult;spouse   Living Arrangements house   Home Accessibility bed and bath on same level;tub/shower is not walk in;stairs to enter home   Number of Stairs to Enter Home 3   Number of Stairs Within Home (flight to basement)   Stair Railings at Home other (see comments)   Transportation Available family or friend will provide   Living Environment Comment Pt lives in house with spouse and 3 adlt kids who are able to assist if needed. Pt has 3 stairs to enter and flight to basement but does not need to access   Self-Care   Dominant Hand right   Usual Activity Tolerance good   Current Activity Tolerance moderate   Regular Exercise no   Equipment Currently Used at Home none   Activity/Exercise/Self-Care Comment Pt IND with self cares. no formal exercise but per wide pt is very active on a daily basis performing yard work, cleaning the house or helping neighbors with shoveling, etc   Functional Level Prior   Ambulation 0-->independent   Transferring 0-->independent   Toileting 0-->independent   Bathing 0-->independent   Dressing 0-->independent   Eating 0-->independent   Communication 0-->understands/communicates without difficulty   Swallowing 0-->swallows foods/liquids without difficulty   Cognition 0 - no cognition issues reported   Fall history within last six months no   Which of the above functional risks had a recent onset or change? ambulation;transferring;toileting;bathing;dressing   Prior Functional Level Comment PT IND with all functional mobility and ADLs. Pt on disability and no longer working   General Information   Onset of Illness/Injury or Date of Surgery - Date 01/25/18   Referring Physician Gómez Carrington MD   Pertinent History of Current Problem (include personal factors and/or comorbidities that impact the POC) 57 year old male POD #1 s/p   LAPAROSCOPIC HEPATECTOMY PARTIAL for hepatocellular carcinoma.     Precautions/Limitations abdominal precautions   Weight-Bearing Status - LUE weight-bearing as tolerated   Weight-Bearing Status - RUE weight-bearing as tolerated   Weight-Bearing Status - LLE full weight-bearing   Weight-Bearing Status - RLE full weight-bearing   General Observations IV, capno, PCA pump, cade   Cognitive Status Examination   Orientation orientation to person, place and time   Level of Consciousness alert   Follows Commands and Answers Questions 100% of the time   Personal Safety and Judgment intact   Memory intact   Pain Assessment   Patient Currently in Pain Yes, see Vital Sign flowsheet   Integumentary/Edema   Integumentary/Edema no deficits were identifed   Posture    Posture Forward head position;Protracted shoulders  (flexed forward d/t abdominal pain)   Range of Motion (ROM)   ROM Comment not formally measured. WFL throughout   Strength   Strength Comments Not formally tested. Pt demonstrates WFL with all mobility performed   Bed Mobility   Bed Mobility Comments CGA and VC for log rolling technique   Transfer Skills   Transfer Comments Pt transfers sit<>stand with CGA and FWW   Gait   Gait Comments Pt amb 180 ft with FWW, CGA with seated rest break after 90 ft.    Balance   Balance Comments normal sitting balance. slightly impaired standing d/t feeling mildly dizzy and weakness through core d/t sx   Sensory Examination   Sensory Perception no deficits were identified   General Therapy Interventions   Planned Therapy Interventions bed mobility training;balance training;neuromuscular re-education;gait training;transfer training;strengthening;stretching;risk factor education;home program guidelines;progressive activity/exercise   Clinical Impression   Criteria for Skilled Therapeutic Intervention yes, treatment indicated   PT Diagnosis Impaired functional mobility   Influenced by the following impairments fatigue, pain, weakness  "  Functional limitations due to impairments bed mobility, transfers, gait, stairs   Clinical Presentation Stable/Uncomplicated   Clinical Presentation Rationale Per pts PMHx and current medical and functional status   Clinical Decision Making (Complexity) Moderate complexity   Therapy Frequency` daily   Predicted Duration of Therapy Intervention (days/wks) 1 week   Anticipated Equipment Needs at Discharge front wheeled walker   Anticipated Discharge Disposition Home with Assist   Risk & Benefits of therapy have been explained Yes   Patient, Family & other staff in agreement with plan of care Yes   [facility] TM \"6 Clicks\"   2016, Trustees of [facility], under license to [employer].  All rights reserved.   6 Clicks Short Forms Basic Mobility Inpatient Short Form   [facility] AM-PAC  \"6 Clicks\" V.2 Basic Mobility Inpatient Short Form   1. Turning from your back to your side while in a flat bed without using bedrails? 3 - A Little   2. Moving from lying on your back to sitting on the side of a flat bed without using bedrails? 3 - A Little   3. Moving to and from a bed to a chair (including a wheelchair)? 3 - A Little   4. Standing up from a chair using your arms (e.g., wheelchair, or bedside chair)? 3 - A Little   5. To walk in hospital room? 3 - A Little   6. Climbing 3-5 steps with a railing? 2 - A Lot   Basic Mobility Raw Score (Score out of 24.Lower scores equate to lower levels of function) 17   Total Evaluation Time   Total Evaluation Time (Minutes) 10     "

## 2018-01-26 NOTE — PROGRESS NOTES
"Surgical Oncology Progress Note    No acute issues overnight. Pain moderately well controlled. Denies fevers, chills, CP, SOB, and N/V. Voiding to cade. no flatus, or BM.     /74 (BP Location: Right arm)  Pulse 104  Temp 99.5  F (37.5  C) (Axillary)  Resp 12  Ht 1.702 m (5' 7\")  Wt 66.7 kg (147 lb 0.8 oz)  SpO2 95%  BMI 23.03 kg/m2  Gen: Awake, alert, NAD   CV: RRR  Resp: non-labored at rest  Abd: Soft, non-distended, NTTP  Ext: warm and well perfused  Incision: c/d/i with dermabond in place    A/P: Ceasar Hui is a 57 year old male, who is POD # 1  following lap partial hepatectomy. Doing OK after surgery.  -CLD  -Pain control with PCA and IV APAP. OK for toradol 15 mg IV q6 hrs if pain still not controlled. Plan to transition to PO meds tomorrow  -Decrease IVF to 75cc/hr  -Lovenox for DVT ppx  -BG 140s-150s. Will order subQ SSI and hold on endo consult for now  -Ambulate today TID  -PO benadryl for itchiness  -Keep cade until tomorrow    Seen and discussed with staff    Fabrice Cervantes MD  General Surgery PGY-3    "

## 2018-01-26 NOTE — PLAN OF CARE
Pt arrived to  from PACU around 1730. Abdominal incisions HALLE, no drainage. Adequate UOP from cade. NPO except ice chips. IVF infusing into PIV @ 125 mL/hr. Cross-cover ordered BG checks q 4hrs, but did not want insulin orders at this time. BG check 159. Stood at edge of bed with 2 assist. Pain managed with Dilaudid PCA 0.2 mg q 10 mins. Pt slightly tachycardic at times and RR as low as 6, other vitals stable. Pt c/o feeling a little SOB and 'forgetting to breathe'. PCA button hung up on IV pole d/t low RR. Cross-cover notified - Fabrice Cowart at bedside to assess. Cambodian  used via ipad - pt denied chest pain/tightness and was taught how to use incentive spirometer. Pt c/o nausea at times, PRN compazine given x1. No emesis. By the end of the shift pt's RR 10-12 while asleep. PRN tylenol available to give when pt feels less nauseated. Pt's son arrived towards the end of shift and plans to stay the night. Continue to monitor and with POC.

## 2018-01-27 ENCOUNTER — APPOINTMENT (OUTPATIENT)
Dept: PHYSICAL THERAPY | Facility: CLINIC | Age: 58
DRG: 406 | End: 2018-01-27
Attending: SURGERY
Payer: COMMERCIAL

## 2018-01-27 LAB
ALBUMIN UR-MCNC: 30 MG/DL
ANION GAP SERPL CALCULATED.3IONS-SCNC: 5 MMOL/L (ref 3–14)
APPEARANCE UR: CLEAR
BILIRUB UR QL STRIP: NEGATIVE
BUN SERPL-MCNC: 9 MG/DL (ref 7–30)
CALCIUM SERPL-MCNC: 8.6 MG/DL (ref 8.5–10.1)
CHLORIDE SERPL-SCNC: 97 MMOL/L (ref 94–109)
CO2 SERPL-SCNC: 33 MMOL/L (ref 20–32)
COLOR UR AUTO: YELLOW
CREAT SERPL-MCNC: 0.81 MG/DL (ref 0.66–1.25)
ERYTHROCYTE [DISTWIDTH] IN BLOOD BY AUTOMATED COUNT: 14 % (ref 10–15)
GFR SERPL CREATININE-BSD FRML MDRD: >90 ML/MIN/1.7M2
GLUCOSE BLDC GLUCOMTR-MCNC: 135 MG/DL (ref 70–99)
GLUCOSE BLDC GLUCOMTR-MCNC: 142 MG/DL (ref 70–99)
GLUCOSE BLDC GLUCOMTR-MCNC: 144 MG/DL (ref 70–99)
GLUCOSE BLDC GLUCOMTR-MCNC: 151 MG/DL (ref 70–99)
GLUCOSE BLDC GLUCOMTR-MCNC: 152 MG/DL (ref 70–99)
GLUCOSE BLDC GLUCOMTR-MCNC: 167 MG/DL (ref 70–99)
GLUCOSE SERPL-MCNC: 152 MG/DL (ref 70–99)
GLUCOSE UR STRIP-MCNC: 300 MG/DL
HCT VFR BLD AUTO: 40.3 % (ref 40–53)
HGB BLD-MCNC: 12.7 G/DL (ref 13.3–17.7)
HGB UR QL STRIP: ABNORMAL
KETONES UR STRIP-MCNC: 80 MG/DL
LEUKOCYTE ESTERASE UR QL STRIP: ABNORMAL
MAGNESIUM SERPL-MCNC: 2 MG/DL (ref 1.6–2.3)
MCH RBC QN AUTO: 26.3 PG (ref 26.5–33)
MCHC RBC AUTO-ENTMCNC: 31.5 G/DL (ref 31.5–36.5)
MCV RBC AUTO: 83 FL (ref 78–100)
MUCOUS THREADS #/AREA URNS LPF: PRESENT /LPF
NITRATE UR QL: NEGATIVE
PH UR STRIP: 6.5 PH (ref 5–7)
PHOSPHATE SERPL-MCNC: 2 MG/DL (ref 2.5–4.5)
PLATELET # BLD AUTO: 112 10E9/L (ref 150–450)
POTASSIUM SERPL-SCNC: 4.2 MMOL/L (ref 3.4–5.3)
RBC # BLD AUTO: 4.83 10E12/L (ref 4.4–5.9)
RBC #/AREA URNS AUTO: >182 /HPF (ref 0–2)
SODIUM SERPL-SCNC: 135 MMOL/L (ref 133–144)
SOURCE: ABNORMAL
SP GR UR STRIP: 1.02 (ref 1–1.03)
UROBILINOGEN UR STRIP-MCNC: NORMAL MG/DL (ref 0–2)
WBC # BLD AUTO: 12.8 10E9/L (ref 4–11)
WBC #/AREA URNS AUTO: 6 /HPF (ref 0–2)

## 2018-01-27 PROCEDURE — 25000132 ZZH RX MED GY IP 250 OP 250 PS 637: Performed by: SURGERY

## 2018-01-27 PROCEDURE — 25000128 H RX IP 250 OP 636: Performed by: PHYSICIAN ASSISTANT

## 2018-01-27 PROCEDURE — 83735 ASSAY OF MAGNESIUM: CPT | Performed by: STUDENT IN AN ORGANIZED HEALTH CARE EDUCATION/TRAINING PROGRAM

## 2018-01-27 PROCEDURE — 00000146 ZZHCL STATISTIC GLUCOSE BY METER IP

## 2018-01-27 PROCEDURE — 25000128 H RX IP 250 OP 636: Performed by: STUDENT IN AN ORGANIZED HEALTH CARE EDUCATION/TRAINING PROGRAM

## 2018-01-27 PROCEDURE — 85027 COMPLETE CBC AUTOMATED: CPT | Performed by: STUDENT IN AN ORGANIZED HEALTH CARE EDUCATION/TRAINING PROGRAM

## 2018-01-27 PROCEDURE — 97116 GAIT TRAINING THERAPY: CPT | Mod: GP

## 2018-01-27 PROCEDURE — 25000125 ZZHC RX 250: Performed by: STUDENT IN AN ORGANIZED HEALTH CARE EDUCATION/TRAINING PROGRAM

## 2018-01-27 PROCEDURE — 93005 ELECTROCARDIOGRAM TRACING: CPT

## 2018-01-27 PROCEDURE — 80048 BASIC METABOLIC PNL TOTAL CA: CPT | Performed by: STUDENT IN AN ORGANIZED HEALTH CARE EDUCATION/TRAINING PROGRAM

## 2018-01-27 PROCEDURE — 84484 ASSAY OF TROPONIN QUANT: CPT | Performed by: STUDENT IN AN ORGANIZED HEALTH CARE EDUCATION/TRAINING PROGRAM

## 2018-01-27 PROCEDURE — 12000008 ZZH R&B INTERMEDIATE UMMC

## 2018-01-27 PROCEDURE — 36415 COLL VENOUS BLD VENIPUNCTURE: CPT | Performed by: STUDENT IN AN ORGANIZED HEALTH CARE EDUCATION/TRAINING PROGRAM

## 2018-01-27 PROCEDURE — 97530 THERAPEUTIC ACTIVITIES: CPT | Mod: GP

## 2018-01-27 PROCEDURE — 40000193 ZZH STATISTIC PT WARD VISIT

## 2018-01-27 PROCEDURE — 81001 URINALYSIS AUTO W/SCOPE: CPT | Performed by: STUDENT IN AN ORGANIZED HEALTH CARE EDUCATION/TRAINING PROGRAM

## 2018-01-27 PROCEDURE — 93010 ELECTROCARDIOGRAM REPORT: CPT | Performed by: INTERNAL MEDICINE

## 2018-01-27 PROCEDURE — 84100 ASSAY OF PHOSPHORUS: CPT | Performed by: STUDENT IN AN ORGANIZED HEALTH CARE EDUCATION/TRAINING PROGRAM

## 2018-01-27 PROCEDURE — 25000128 H RX IP 250 OP 636: Performed by: SURGERY

## 2018-01-27 RX ORDER — ACETAMINOPHEN 500 MG
500 TABLET ORAL EVERY 6 HOURS
Status: COMPLETED | OUTPATIENT
Start: 2018-01-27 | End: 2018-01-28

## 2018-01-27 RX ADMIN — RANITIDINE 150 MG: 150 TABLET, FILM COATED ORAL at 17:56

## 2018-01-27 RX ADMIN — ATORVASTATIN CALCIUM 40 MG: 40 TABLET, FILM COATED ORAL at 08:42

## 2018-01-27 RX ADMIN — INSULIN ASPART 1 UNITS: 100 INJECTION, SOLUTION INTRAVENOUS; SUBCUTANEOUS at 08:43

## 2018-01-27 RX ADMIN — HYDROMORPHONE HYDROCHLORIDE: 10 INJECTION, SOLUTION INTRAMUSCULAR; INTRAVENOUS; SUBCUTANEOUS at 21:55

## 2018-01-27 RX ADMIN — SODIUM CHLORIDE, POTASSIUM CHLORIDE, SODIUM LACTATE AND CALCIUM CHLORIDE 500 ML: 600; 310; 30; 20 INJECTION, SOLUTION INTRAVENOUS at 10:16

## 2018-01-27 RX ADMIN — POTASSIUM PHOSPHATE, MONOBASIC AND POTASSIUM PHOSPHATE, DIBASIC 20 MMOL: 224; 236 INJECTION, SOLUTION INTRAVENOUS at 12:03

## 2018-01-27 RX ADMIN — INSULIN ASPART 1 UNITS: 100 INJECTION, SOLUTION INTRAVENOUS; SUBCUTANEOUS at 17:56

## 2018-01-27 RX ADMIN — HYDROMORPHONE HYDROCHLORIDE: 10 INJECTION, SOLUTION INTRAMUSCULAR; INTRAVENOUS; SUBCUTANEOUS at 14:09

## 2018-01-27 RX ADMIN — INSULIN ASPART 1 UNITS: 100 INJECTION, SOLUTION INTRAVENOUS; SUBCUTANEOUS at 12:15

## 2018-01-27 RX ADMIN — ACETAMINOPHEN 500 MG: 500 TABLET, FILM COATED ORAL at 12:04

## 2018-01-27 RX ADMIN — ACETAMINOPHEN 500 MG: 500 TABLET, FILM COATED ORAL at 17:56

## 2018-01-27 RX ADMIN — Medication 5 MG: at 23:18

## 2018-01-27 RX ADMIN — TENOFOVIR DISOPROXIL FUMARATE 300 MG: 300 TABLET, COATED ORAL at 08:42

## 2018-01-27 RX ADMIN — RANITIDINE 150 MG: 150 TABLET, FILM COATED ORAL at 05:06

## 2018-01-27 RX ADMIN — ACETAMINOPHEN 500 MG: 10 INJECTION, SOLUTION INTRAVENOUS at 05:10

## 2018-01-27 RX ADMIN — ENOXAPARIN SODIUM 40 MG: 40 INJECTION SUBCUTANEOUS at 12:04

## 2018-01-27 RX ADMIN — ACETAMINOPHEN 500 MG: 500 TABLET, FILM COATED ORAL at 23:18

## 2018-01-27 ASSESSMENT — PAIN DESCRIPTION - DESCRIPTORS
DESCRIPTORS: SORE

## 2018-01-27 NOTE — PLAN OF CARE
Problem: Patient Care Overview  Goal: Plan of Care/Patient Progress Review  PT 7C:     Discharge Planner PT   Patient plan for discharge: home  Current status: Engaged pt in bed mobility min A, sit <> stand CGA, gait with FWW ~160ft total at CGA with slowed gait speed. Pt with difficulty maintaining abd precautions for log roll. Pt wearing 4L O2 via NC throughout session. HR up to 120s with activity.   Barriers to return to prior living situation: medical status, stairs, good social support  Recommendations for discharge: home with assist as needed - may require FWW upon discharge  Rationale for recommendations: Pt is below his baseline for mobility, but anticipate pt will be safe to discharge to home when medically appropriate. Will continue to modify recommendations as appropriate.        Entered by: Brinda Parks 01/27/2018 4:42 PM

## 2018-01-27 NOTE — PROGRESS NOTES
"01/26/2018  General Surgery Cross Cover Note    Was called to bedside to evaluate patient Ceasar Hui for new onset tachycardia into the 150's. Patient has been asymptomatic and his BP's have been stable. Using his PCA with minimal complaints of pain. Ambulated earlier today. Family by the bedside, son assists with interpretation.     Exam:   /78 (BP Location: Right arm)  Pulse 111  Temp 100.8  F (38.2  C) (Oral)  Resp 12  Ht 1.702 m (5' 7\")  Wt 67.8 kg (149 lb 6.4 oz)  SpO2 93%  BMI 23.4 kg/m2  General: Alert, interactive, & in NAD  Resp: Nonlabored breathing on NC 1L  Cardiac: Tachycardia  Abdomen: Soft, appropriately tender, nondistended.  Incision: c/d/i without erythema, warmth, or discharge.   Extremities: No LE edema or obvious joint abnormalities, no calf swelling or TTP  Skin: Warm and dry, no jaundice or rash    I/O last 3 completed shifts:  In: 2524.17 [P.O.:260; I.V.:2264.17]  Out: 2850 [Urine:2850]    Assessment:  Ceasar Hui is a 57 year old male POD # 1 partial hepatectomy for HCC with new onset sinus tachycardia.    Plan:  EKG shows sinus tachycardia  Further workup wnl, WBC stable from prior. Troponins not elevated  Continue current cares otherwise      Fabrice Cowart DO  General Surgery PGY 1   [phone]  (After 6AM please page primary team)  "

## 2018-01-27 NOTE — PLAN OF CARE
Problem: Patient Care Overview  Goal: Plan of Care/Patient Progress Review  Outcome: No Change  0430 temp 101.1, notified MD. Scheduled Tylenol administered. Increased Dilaudid PCA to 0.3mg/10min. Abd incision CDI. Camara with adequate output. Continue to monitor.

## 2018-01-27 NOTE — PLAN OF CARE
Problem: Surgery Nonspecified (Adult)  Goal: Signs and Symptoms of Listed Potential Problems Will be Absent, Minimized or Managed (Surgery Nonspecified)  Signs and symptoms of listed potential problems will be absent, minimized or managed by discharge/transition of care (reference Surgery Nonspecified (Adult) CPG).   Tachycardic to 110's OVSS for most of the shift. Approx 1730 Pt's heart rate increased to consistent 130's-140's with a high of 150. MD paged and came to assess Pt at bedside. Labs ordered as well as STAT EKG and CXR. Results pending. Pt resting comfortably in bed and says pain adequately controlled with dilaudid PCA. Insulin given per sliding scale. Tolerating clear liquid diet. Zofran given 1X for nausea. Pt ambulated in halls with PT. Family at bedside. Cont. POC.    Pt declines bedside report.

## 2018-01-27 NOTE — PROGRESS NOTES
"Surgical Oncology Progress Note    Asymptomatic tachycardia to 150s yesterday evening while walking, without chest pain. EKG showed sinus tachycardia with nonspecific T wave abnormalities, troponin negative, CXR with patchy linear opacities on right side. Febrile to 100.8 during this episode, peaking at 101.1 at 4am. WBC down trending from 14.2 yesterday, now 12.8. Mild discomfort at incision site, but overall feeling well.     /69 (BP Location: Right arm)  Pulse 111  Temp 99.6  F (37.6  C) (Oral)  Resp 8  Ht 1.702 m (5' 7\")  Wt 67.8 kg (149 lb 6.4 oz)  SpO2 94%  BMI 23.4 kg/m2  Gen: Awake, alert, NAD   CV: RRR  Resp: non-labored at rest  Abd: Soft, non-distended, mild tenderness to palpation near incision. Small ecchymoses present at left lateral incision site  Ext: warm and well perfused  Incision: c/d/i with dermabond in place    A/P: Ceasar Hui is a 57 year old male, who is POD # 2 following lap partial hepatectomy. Doing OK after surgery. Sinus tach to 150s on evening of 1/26 with concurrent fever of 100.8, peaking at 101.1. EKG, trop, and CXR unremarkable.     -UA/UC  -f/u EKG  -fluid bolus  -dilaudid PCA  -Continue CLD until passing more flatus  -remove cade today  -SSI  -Lovenox for DVT ppx  -Ambulate today TID    Seen and discussed with Dr. Mayo Ball MD  Surgical Oncology PGY2        "

## 2018-01-28 ENCOUNTER — APPOINTMENT (OUTPATIENT)
Dept: OCCUPATIONAL THERAPY | Facility: CLINIC | Age: 58
DRG: 406 | End: 2018-01-28
Attending: SURGERY
Payer: COMMERCIAL

## 2018-01-28 ENCOUNTER — APPOINTMENT (OUTPATIENT)
Dept: PHYSICAL THERAPY | Facility: CLINIC | Age: 58
DRG: 406 | End: 2018-01-28
Attending: SURGERY
Payer: COMMERCIAL

## 2018-01-28 LAB
ALBUMIN SERPL-MCNC: 3 G/DL (ref 3.4–5)
ALP SERPL-CCNC: 62 U/L (ref 40–150)
ALT SERPL W P-5'-P-CCNC: 458 U/L (ref 0–70)
ANION GAP SERPL CALCULATED.3IONS-SCNC: 8 MMOL/L (ref 3–14)
AST SERPL W P-5'-P-CCNC: 207 U/L (ref 0–45)
BILIRUB DIRECT SERPL-MCNC: 0.2 MG/DL (ref 0–0.2)
BILIRUB SERPL-MCNC: 1.1 MG/DL (ref 0.2–1.3)
BUN SERPL-MCNC: 12 MG/DL (ref 7–30)
CALCIUM SERPL-MCNC: 8.5 MG/DL (ref 8.5–10.1)
CHLORIDE SERPL-SCNC: 96 MMOL/L (ref 94–109)
CO2 SERPL-SCNC: 30 MMOL/L (ref 20–32)
CREAT SERPL-MCNC: 0.61 MG/DL (ref 0.66–1.25)
ERYTHROCYTE [DISTWIDTH] IN BLOOD BY AUTOMATED COUNT: 13.9 % (ref 10–15)
GFR SERPL CREATININE-BSD FRML MDRD: >90 ML/MIN/1.7M2
GLUCOSE BLDC GLUCOMTR-MCNC: 131 MG/DL (ref 70–99)
GLUCOSE BLDC GLUCOMTR-MCNC: 141 MG/DL (ref 70–99)
GLUCOSE BLDC GLUCOMTR-MCNC: 152 MG/DL (ref 70–99)
GLUCOSE BLDC GLUCOMTR-MCNC: 162 MG/DL (ref 70–99)
GLUCOSE BLDC GLUCOMTR-MCNC: 169 MG/DL (ref 70–99)
GLUCOSE SERPL-MCNC: 154 MG/DL (ref 70–99)
HCT VFR BLD AUTO: 38.8 % (ref 40–53)
HGB BLD-MCNC: 12 G/DL (ref 13.3–17.7)
MCH RBC QN AUTO: 26.1 PG (ref 26.5–33)
MCHC RBC AUTO-ENTMCNC: 30.9 G/DL (ref 31.5–36.5)
MCV RBC AUTO: 84 FL (ref 78–100)
PLATELET # BLD AUTO: 138 10E9/L (ref 150–450)
POTASSIUM SERPL-SCNC: 4.1 MMOL/L (ref 3.4–5.3)
PROT SERPL-MCNC: 7.3 G/DL (ref 6.8–8.8)
RBC # BLD AUTO: 4.6 10E12/L (ref 4.4–5.9)
SODIUM SERPL-SCNC: 134 MMOL/L (ref 133–144)
WBC # BLD AUTO: 9.2 10E9/L (ref 4–11)

## 2018-01-28 PROCEDURE — 97530 THERAPEUTIC ACTIVITIES: CPT | Mod: GP

## 2018-01-28 PROCEDURE — 25000128 H RX IP 250 OP 636: Performed by: SURGERY

## 2018-01-28 PROCEDURE — 25000132 ZZH RX MED GY IP 250 OP 250 PS 637: Performed by: SURGERY

## 2018-01-28 PROCEDURE — 40000133 ZZH STATISTIC OT WARD VISIT

## 2018-01-28 PROCEDURE — 97535 SELF CARE MNGMENT TRAINING: CPT | Mod: GO

## 2018-01-28 PROCEDURE — 80048 BASIC METABOLIC PNL TOTAL CA: CPT | Performed by: SURGERY

## 2018-01-28 PROCEDURE — 97530 THERAPEUTIC ACTIVITIES: CPT | Mod: GO

## 2018-01-28 PROCEDURE — 36415 COLL VENOUS BLD VENIPUNCTURE: CPT | Performed by: SURGERY

## 2018-01-28 PROCEDURE — 80076 HEPATIC FUNCTION PANEL: CPT | Performed by: SURGERY

## 2018-01-28 PROCEDURE — 12000001 ZZH R&B MED SURG/OB UMMC

## 2018-01-28 PROCEDURE — 97116 GAIT TRAINING THERAPY: CPT | Mod: GP

## 2018-01-28 PROCEDURE — 25000132 ZZH RX MED GY IP 250 OP 250 PS 637: Performed by: STUDENT IN AN ORGANIZED HEALTH CARE EDUCATION/TRAINING PROGRAM

## 2018-01-28 PROCEDURE — 00000146 ZZHCL STATISTIC GLUCOSE BY METER IP

## 2018-01-28 PROCEDURE — 40000193 ZZH STATISTIC PT WARD VISIT

## 2018-01-28 PROCEDURE — 85027 COMPLETE CBC AUTOMATED: CPT | Performed by: SURGERY

## 2018-01-28 RX ORDER — HYDROMORPHONE HYDROCHLORIDE 1 MG/ML
.3-.5 INJECTION, SOLUTION INTRAMUSCULAR; INTRAVENOUS; SUBCUTANEOUS
Status: DISCONTINUED | OUTPATIENT
Start: 2018-01-28 | End: 2018-01-30 | Stop reason: HOSPADM

## 2018-01-28 RX ORDER — CHLORPROMAZINE HYDROCHLORIDE 25 MG/1
25 TABLET, FILM COATED ORAL 3 TIMES DAILY PRN
Status: DISCONTINUED | OUTPATIENT
Start: 2018-01-28 | End: 2018-01-30 | Stop reason: HOSPADM

## 2018-01-28 RX ORDER — BISACODYL 10 MG
10 SUPPOSITORY, RECTAL RECTAL DAILY PRN
Status: DISCONTINUED | OUTPATIENT
Start: 2018-01-28 | End: 2018-01-30 | Stop reason: HOSPADM

## 2018-01-28 RX ORDER — OXYCODONE HYDROCHLORIDE 5 MG/1
5-10 TABLET ORAL EVERY 4 HOURS PRN
Status: DISCONTINUED | OUTPATIENT
Start: 2018-01-28 | End: 2018-01-30 | Stop reason: HOSPADM

## 2018-01-28 RX ORDER — POLYETHYLENE GLYCOL 3350 17 G/17G
17 POWDER, FOR SOLUTION ORAL DAILY PRN
Status: DISCONTINUED | OUTPATIENT
Start: 2018-01-28 | End: 2018-01-30 | Stop reason: HOSPADM

## 2018-01-28 RX ADMIN — INSULIN ASPART 1 UNITS: 100 INJECTION, SOLUTION INTRAVENOUS; SUBCUTANEOUS at 12:07

## 2018-01-28 RX ADMIN — Medication 5 MG: at 22:47

## 2018-01-28 RX ADMIN — RANITIDINE 150 MG: 150 TABLET, FILM COATED ORAL at 06:20

## 2018-01-28 RX ADMIN — INSULIN ASPART 1 UNITS: 100 INJECTION, SOLUTION INTRAVENOUS; SUBCUTANEOUS at 09:39

## 2018-01-28 RX ADMIN — OXYCODONE HYDROCHLORIDE 10 MG: 5 TABLET ORAL at 21:33

## 2018-01-28 RX ADMIN — ONDANSETRON 4 MG: 2 INJECTION INTRAMUSCULAR; INTRAVENOUS at 10:55

## 2018-01-28 RX ADMIN — RANITIDINE 150 MG: 150 TABLET, FILM COATED ORAL at 17:28

## 2018-01-28 RX ADMIN — OXYCODONE HYDROCHLORIDE 10 MG: 5 TABLET ORAL at 17:29

## 2018-01-28 RX ADMIN — ACETAMINOPHEN 500 MG: 500 TABLET, FILM COATED ORAL at 06:20

## 2018-01-28 RX ADMIN — ENOXAPARIN SODIUM 40 MG: 40 INJECTION SUBCUTANEOUS at 12:03

## 2018-01-28 RX ADMIN — OXYCODONE HYDROCHLORIDE 10 MG: 5 TABLET ORAL at 13:20

## 2018-01-28 RX ADMIN — TENOFOVIR DISOPROXIL FUMARATE 300 MG: 300 TABLET, COATED ORAL at 09:39

## 2018-01-28 RX ADMIN — ATORVASTATIN CALCIUM 40 MG: 40 TABLET, FILM COATED ORAL at 09:39

## 2018-01-28 RX ADMIN — CHLORPROMAZINE HYDROCHLORIDE 25 MG: 25 TABLET, SUGAR COATED ORAL at 21:33

## 2018-01-28 RX ADMIN — INSULIN ASPART 1 UNITS: 100 INJECTION, SOLUTION INTRAVENOUS; SUBCUTANEOUS at 17:28

## 2018-01-28 ASSESSMENT — PAIN DESCRIPTION - DESCRIPTORS
DESCRIPTORS: SORE
DESCRIPTORS: ACHING;SORE
DESCRIPTORS: SORE
DESCRIPTORS: SORE

## 2018-01-28 NOTE — PLAN OF CARE
Problem: Patient Care Overview  Goal: Plan of Care/Patient Progress Review  PT 7C: up with gait belt and hands on gait belt if ambulating without FWW    Discharge Planner PT   Patient plan for discharge: home with family  Current status: Engaged pt in bed mobility SBA with poor adherence to abd precautions, sit <> stand SBA, gait without AD at strong CGA with intermittent min A secondary to sway and mild LOB. Pt on RA for activity and sats 98%.   Barriers to return to prior living situation: medical status, home set up, good social support  Recommendations for discharge: home with assist from family as needed to maintain safety and precautions  Rationale for recommendations: Pt is below his baseline for mobility, but is improving his mobility. Pt remains limited by pain, impaired functional strength, and impaired balance. Family is very supportive and tending to all pt's needs.        Entered by: Brinda Parks 01/28/2018 1:17 PM

## 2018-01-28 NOTE — PROGRESS NOTES
"Surgical Oncology Progress Note    No acute issues overnight. Pain controlled. Denies fevers, chills, CP, SOB, and N/V. Tolerating CLD. Voiding. Not passing flatus, or BM. Having a small amount of burping.     /78 (BP Location: Right arm)  Pulse 111  Temp 97.7  F (36.5  C) (Oral)  Resp 15  Ht 1.702 m (5' 7\")  Wt 67.7 kg (149 lb 3.2 oz)  SpO2 97%  BMI 23.37 kg/m2  Gen: Awake, alert, NAD   CV: RRR  Resp: non-labored at rest  Abd: Soft, mild-distention, no tenderness to palpation near incision. Small ecchymoses present at left lateral incision site  Ext: warm and well perfused  Incision: c/d/i with dermabond in place     A/P: Ceasar Hui is a 57 year old male, who is POD # 3 following lap partial hepatectomy. Doing OK after surgery.     -dilaudid PCA. Possible transition to PO pain meds  -Possible advancement to FLD  -SSI  -Lovenox for DVT ppx  -Ambulate today TID     Seen and will be discussed with Dr. Mayo Cervantes MD  General Surgery PGY-3    "

## 2018-01-28 NOTE — PLAN OF CARE
Problem: Surgery Nonspecified (Adult)  Goal: Signs and Symptoms of Listed Potential Problems Will be Absent, Minimized or Managed (Surgery Nonspecified)  Signs and symptoms of listed potential problems will be absent, minimized or managed by discharge/transition of care (reference Surgery Nonspecified (Adult) CPG).   Outcome: Improving  Tachycardic, OVSS. Pain controlled with dilaudid PCA. Insulin given per sliding scale. Tolerating clear liquid diet. Camara removed this AM. Voids spont with adequate UOP. Ambulated in halls. Family at bedside. Cont. POC.

## 2018-01-28 NOTE — PLAN OF CARE
Problem: Patient Care Overview  Goal: Plan of Care/Patient Progress Review  Outcome: No Change  Remain tachy, AOVSS. Pain controlled with Dilaudid PCA and scheduled Tylenol. Tolerating clears, denies nausea. Incisions CDI. Up with SBA. Continue to monitor.

## 2018-01-28 NOTE — PLAN OF CARE
"Problem: Patient Care Overview  Goal: Plan of Care/Patient Progress Review  Outcome: Improving  /78 (BP Location: Right arm)  Pulse 111  Temp 97.7  F (36.5  C) (Oral)  Resp 15  Ht 1.702 m (5' 7\")  Wt 67.7 kg (149 lb 3.2 oz)  SpO2 97%  BMI 23.37 kg/m2    AVSS on 1L NC. A&O x 4, able to make needs known. Up with SBA with GB, ambulated in hallway x2. Right Upper quadrant pain controlled after PCA was discontinued with ice packed and prn oxycodone x1, see MAR. Nausea controlled with zofran x1. Tolerating full liquid diet. Incision is CDI, HALLE, dermabond closure. Urine is clear yellow, voiding adequate amoutns. Passing flatus, denies post-operative BM. PIV SL. Plan of care discussed with pt, continue plan of care. Pt would not like bedside report.   Update: Patient ambulated in hallway once more with SBA and gait belt. Right upper quadrant pain increased due to hiccups, otherwise pain controlled with oxycodone once more.       "

## 2018-01-28 NOTE — PLAN OF CARE
Problem: Patient Care Overview  Goal: Plan of Care/Patient Progress Review  Discharge Planner OT   Patient plan for discharge: home  Current status: Pt able to manage socks on/off using figure 4 technique.  Pt mod IND using log roll technique for supine>EOB but requires reminders for EOB>supine.  Pt becomes nauseated and has emesis sitting up.  RN notified. Discussed shower chair with pt and wife; they are receptive and will continue to explore as pt progresses with therapy.  Barriers to return to prior living situation: medical status, level of assist  Recommendations for discharge: likely home with assist from family with progress in therapy  Rationale for recommendations: Family able to provide assist for ADL as needed       Entered by: Shanon Quezada 01/28/2018 4:31 PM

## 2018-01-29 ENCOUNTER — APPOINTMENT (OUTPATIENT)
Dept: CT IMAGING | Facility: CLINIC | Age: 58
DRG: 406 | End: 2018-01-29
Attending: SURGERY
Payer: COMMERCIAL

## 2018-01-29 ENCOUNTER — APPOINTMENT (OUTPATIENT)
Dept: PHYSICAL THERAPY | Facility: CLINIC | Age: 58
DRG: 406 | End: 2018-01-29
Attending: SURGERY
Payer: COMMERCIAL

## 2018-01-29 ENCOUNTER — APPOINTMENT (OUTPATIENT)
Dept: OCCUPATIONAL THERAPY | Facility: CLINIC | Age: 58
DRG: 406 | End: 2018-01-29
Attending: SURGERY
Payer: COMMERCIAL

## 2018-01-29 LAB
ALBUMIN SERPL-MCNC: 2.9 G/DL (ref 3.4–5)
ALP SERPL-CCNC: 74 U/L (ref 40–150)
ALT SERPL W P-5'-P-CCNC: 360 U/L (ref 0–70)
ANION GAP SERPL CALCULATED.3IONS-SCNC: 10 MMOL/L (ref 3–14)
AST SERPL W P-5'-P-CCNC: 142 U/L (ref 0–45)
BILIRUB DIRECT SERPL-MCNC: 0.2 MG/DL (ref 0–0.2)
BILIRUB SERPL-MCNC: 1 MG/DL (ref 0.2–1.3)
BUN SERPL-MCNC: 11 MG/DL (ref 7–30)
CALCIUM SERPL-MCNC: 8.3 MG/DL (ref 8.5–10.1)
CHLORIDE SERPL-SCNC: 92 MMOL/L (ref 94–109)
CO2 SERPL-SCNC: 30 MMOL/L (ref 20–32)
CREAT SERPL-MCNC: 0.64 MG/DL (ref 0.66–1.25)
ERYTHROCYTE [DISTWIDTH] IN BLOOD BY AUTOMATED COUNT: 13.6 % (ref 10–15)
GFR SERPL CREATININE-BSD FRML MDRD: >90 ML/MIN/1.7M2
GLUCOSE BLDC GLUCOMTR-MCNC: 162 MG/DL (ref 70–99)
GLUCOSE BLDC GLUCOMTR-MCNC: 177 MG/DL (ref 70–99)
GLUCOSE BLDC GLUCOMTR-MCNC: 184 MG/DL (ref 70–99)
GLUCOSE SERPL-MCNC: 168 MG/DL (ref 70–99)
HCT VFR BLD AUTO: 37.8 % (ref 40–53)
HGB BLD-MCNC: 11.9 G/DL (ref 13.3–17.7)
MAGNESIUM SERPL-MCNC: 2 MG/DL (ref 1.6–2.3)
MCH RBC QN AUTO: 26 PG (ref 26.5–33)
MCHC RBC AUTO-ENTMCNC: 31.5 G/DL (ref 31.5–36.5)
MCV RBC AUTO: 83 FL (ref 78–100)
PHOSPHATE SERPL-MCNC: 1.6 MG/DL (ref 2.5–4.5)
PLATELET # BLD AUTO: 140 10E9/L (ref 150–450)
POTASSIUM SERPL-SCNC: 3.3 MMOL/L (ref 3.4–5.3)
PROT SERPL-MCNC: 7.2 G/DL (ref 6.8–8.8)
RBC # BLD AUTO: 4.58 10E12/L (ref 4.4–5.9)
SODIUM SERPL-SCNC: 132 MMOL/L (ref 133–144)
TROPONIN I SERPL-MCNC: <0.015 UG/L (ref 0–0.04)
WBC # BLD AUTO: 7 10E9/L (ref 4–11)

## 2018-01-29 PROCEDURE — 25000132 ZZH RX MED GY IP 250 OP 250 PS 637: Performed by: SURGERY

## 2018-01-29 PROCEDURE — 40000133 ZZH STATISTIC OT WARD VISIT

## 2018-01-29 PROCEDURE — 84100 ASSAY OF PHOSPHORUS: CPT | Performed by: SURGERY

## 2018-01-29 PROCEDURE — 71260 CT THORAX DX C+: CPT

## 2018-01-29 PROCEDURE — 83735 ASSAY OF MAGNESIUM: CPT | Performed by: SURGERY

## 2018-01-29 PROCEDURE — 97530 THERAPEUTIC ACTIVITIES: CPT | Mod: GP | Performed by: REHABILITATION PRACTITIONER

## 2018-01-29 PROCEDURE — 25000125 ZZHC RX 250: Performed by: PHYSICIAN ASSISTANT

## 2018-01-29 PROCEDURE — 00000146 ZZHCL STATISTIC GLUCOSE BY METER IP

## 2018-01-29 PROCEDURE — 25000128 H RX IP 250 OP 636: Performed by: RADIOLOGY

## 2018-01-29 PROCEDURE — 97535 SELF CARE MNGMENT TRAINING: CPT | Mod: GO

## 2018-01-29 PROCEDURE — 93005 ELECTROCARDIOGRAM TRACING: CPT

## 2018-01-29 PROCEDURE — 80076 HEPATIC FUNCTION PANEL: CPT | Performed by: SURGERY

## 2018-01-29 PROCEDURE — 25000125 ZZHC RX 250: Performed by: RADIOLOGY

## 2018-01-29 PROCEDURE — 97116 GAIT TRAINING THERAPY: CPT | Mod: GP | Performed by: REHABILITATION PRACTITIONER

## 2018-01-29 PROCEDURE — 25000128 H RX IP 250 OP 636: Performed by: PHYSICIAN ASSISTANT

## 2018-01-29 PROCEDURE — 40000193 ZZH STATISTIC PT WARD VISIT: Performed by: REHABILITATION PRACTITIONER

## 2018-01-29 PROCEDURE — 36415 COLL VENOUS BLD VENIPUNCTURE: CPT | Performed by: STUDENT IN AN ORGANIZED HEALTH CARE EDUCATION/TRAINING PROGRAM

## 2018-01-29 PROCEDURE — 84484 ASSAY OF TROPONIN QUANT: CPT | Performed by: STUDENT IN AN ORGANIZED HEALTH CARE EDUCATION/TRAINING PROGRAM

## 2018-01-29 PROCEDURE — 93010 ELECTROCARDIOGRAM REPORT: CPT | Performed by: INTERNAL MEDICINE

## 2018-01-29 PROCEDURE — 80048 BASIC METABOLIC PNL TOTAL CA: CPT | Performed by: SURGERY

## 2018-01-29 PROCEDURE — 40000141 ZZH STATISTIC PERIPHERAL IV START W/O US GUIDANCE

## 2018-01-29 PROCEDURE — 12000001 ZZH R&B MED SURG/OB UMMC

## 2018-01-29 PROCEDURE — 36415 COLL VENOUS BLD VENIPUNCTURE: CPT | Performed by: SURGERY

## 2018-01-29 PROCEDURE — 25000128 H RX IP 250 OP 636: Performed by: SURGERY

## 2018-01-29 PROCEDURE — 25000132 ZZH RX MED GY IP 250 OP 250 PS 637: Performed by: PHYSICIAN ASSISTANT

## 2018-01-29 PROCEDURE — 25000132 ZZH RX MED GY IP 250 OP 250 PS 637: Performed by: STUDENT IN AN ORGANIZED HEALTH CARE EDUCATION/TRAINING PROGRAM

## 2018-01-29 PROCEDURE — 85027 COMPLETE CBC AUTOMATED: CPT | Performed by: SURGERY

## 2018-01-29 RX ORDER — GLYBURIDE 2.5 MG/1
5 TABLET ORAL
Status: DISCONTINUED | OUTPATIENT
Start: 2018-01-30 | End: 2018-01-29

## 2018-01-29 RX ORDER — POLYETHYLENE GLYCOL 3350 17 G/17G
17 POWDER, FOR SOLUTION ORAL DAILY PRN
Qty: 14 PACKET | Refills: 0 | Status: SHIPPED | OUTPATIENT
Start: 2018-01-29

## 2018-01-29 RX ORDER — POTASSIUM CHLORIDE 750 MG/1
20-40 TABLET, EXTENDED RELEASE ORAL
Status: DISCONTINUED | OUTPATIENT
Start: 2018-01-29 | End: 2018-01-30 | Stop reason: HOSPADM

## 2018-01-29 RX ORDER — POTASSIUM CHLORIDE 29.8 MG/ML
20 INJECTION INTRAVENOUS
Status: DISCONTINUED | OUTPATIENT
Start: 2018-01-29 | End: 2018-01-30 | Stop reason: HOSPADM

## 2018-01-29 RX ORDER — POTASSIUM CHLORIDE 750 MG/1
40 TABLET, EXTENDED RELEASE ORAL ONCE
Status: COMPLETED | OUTPATIENT
Start: 2018-01-29 | End: 2018-01-29

## 2018-01-29 RX ORDER — POTASSIUM CHLORIDE 750 MG/1
40 TABLET, EXTENDED RELEASE ORAL DAILY
Status: DISCONTINUED | OUTPATIENT
Start: 2018-01-29 | End: 2018-01-29

## 2018-01-29 RX ORDER — POTASSIUM CHLORIDE 1.5 G/1.58G
20-40 POWDER, FOR SOLUTION ORAL
Status: DISCONTINUED | OUTPATIENT
Start: 2018-01-29 | End: 2018-01-30 | Stop reason: HOSPADM

## 2018-01-29 RX ORDER — IOPAMIDOL 755 MG/ML
54 INJECTION, SOLUTION INTRAVASCULAR ONCE
Status: COMPLETED | OUTPATIENT
Start: 2018-01-29 | End: 2018-01-29

## 2018-01-29 RX ORDER — KETOROLAC TROMETHAMINE 30 MG/ML
15 INJECTION, SOLUTION INTRAMUSCULAR; INTRAVENOUS ONCE
Status: COMPLETED | OUTPATIENT
Start: 2018-01-29 | End: 2018-01-29

## 2018-01-29 RX ORDER — IBUPROFEN 600 MG/1
600 TABLET, FILM COATED ORAL 4 TIMES DAILY
Status: DISCONTINUED | OUTPATIENT
Start: 2018-01-29 | End: 2018-01-30 | Stop reason: HOSPADM

## 2018-01-29 RX ORDER — OXYCODONE HYDROCHLORIDE 5 MG/1
5-10 TABLET ORAL EVERY 4 HOURS PRN
Qty: 30 TABLET | Refills: 0 | Status: SHIPPED | OUTPATIENT
Start: 2018-01-29

## 2018-01-29 RX ORDER — DIPHENHYDRAMINE HYDROCHLORIDE 50 MG/ML
25 INJECTION INTRAMUSCULAR; INTRAVENOUS EVERY 6 HOURS PRN
Status: DISCONTINUED | OUTPATIENT
Start: 2018-01-29 | End: 2018-01-30 | Stop reason: HOSPADM

## 2018-01-29 RX ORDER — POTASSIUM CL/LIDO/0.9 % NACL 10MEQ/0.1L
10 INTRAVENOUS SOLUTION, PIGGYBACK (ML) INTRAVENOUS
Status: DISCONTINUED | OUTPATIENT
Start: 2018-01-29 | End: 2018-01-30 | Stop reason: HOSPADM

## 2018-01-29 RX ORDER — IBUPROFEN 600 MG/1
600 TABLET, FILM COATED ORAL 4 TIMES DAILY
Status: DISCONTINUED | OUTPATIENT
Start: 2018-01-29 | End: 2018-01-29

## 2018-01-29 RX ORDER — POTASSIUM CHLORIDE 7.45 MG/ML
10 INJECTION INTRAVENOUS
Status: DISCONTINUED | OUTPATIENT
Start: 2018-01-29 | End: 2018-01-30 | Stop reason: HOSPADM

## 2018-01-29 RX ADMIN — POTASSIUM PHOSPHATE, MONOBASIC AND POTASSIUM PHOSPHATE, DIBASIC 20 MMOL: 224; 236 INJECTION, SOLUTION INTRAVENOUS at 15:23

## 2018-01-29 RX ADMIN — INSULIN ASPART 1 UNITS: 100 INJECTION, SOLUTION INTRAVENOUS; SUBCUTANEOUS at 08:21

## 2018-01-29 RX ADMIN — OXYCODONE HYDROCHLORIDE 10 MG: 5 TABLET ORAL at 19:39

## 2018-01-29 RX ADMIN — DIPHENHYDRAMINE HYDROCHLORIDE 50 MG: 25 CAPSULE ORAL at 23:13

## 2018-01-29 RX ADMIN — IBUPROFEN 600 MG: 600 TABLET ORAL at 19:39

## 2018-01-29 RX ADMIN — ENOXAPARIN SODIUM 40 MG: 40 INJECTION SUBCUTANEOUS at 11:03

## 2018-01-29 RX ADMIN — ATORVASTATIN CALCIUM 40 MG: 40 TABLET, FILM COATED ORAL at 08:20

## 2018-01-29 RX ADMIN — OXYCODONE HYDROCHLORIDE 10 MG: 5 TABLET ORAL at 13:55

## 2018-01-29 RX ADMIN — OXYCODONE HYDROCHLORIDE 10 MG: 5 TABLET ORAL at 04:07

## 2018-01-29 RX ADMIN — IBUPROFEN 600 MG: 600 TABLET ORAL at 16:33

## 2018-01-29 RX ADMIN — Medication 5 MG: at 21:47

## 2018-01-29 RX ADMIN — RANITIDINE 150 MG: 150 TABLET, FILM COATED ORAL at 08:22

## 2018-01-29 RX ADMIN — TENOFOVIR DISOPROXIL FUMARATE 300 MG: 300 TABLET, COATED ORAL at 08:20

## 2018-01-29 RX ADMIN — RANITIDINE 150 MG: 150 TABLET, FILM COATED ORAL at 19:39

## 2018-01-29 RX ADMIN — IOPAMIDOL 54 ML: 755 INJECTION, SOLUTION INTRAVENOUS at 14:51

## 2018-01-29 RX ADMIN — OXYCODONE HYDROCHLORIDE 5 MG: 5 TABLET ORAL at 08:26

## 2018-01-29 RX ADMIN — SODIUM CHLORIDE, PRESERVATIVE FREE 85 ML: 5 INJECTION INTRAVENOUS at 14:51

## 2018-01-29 RX ADMIN — POTASSIUM CHLORIDE 40 MEQ: 750 TABLET, EXTENDED RELEASE ORAL at 09:30

## 2018-01-29 RX ADMIN — KETOROLAC TROMETHAMINE 15 MG: 30 INJECTION, SOLUTION INTRAMUSCULAR at 09:38

## 2018-01-29 RX ADMIN — INSULIN ASPART 1 UNITS: 100 INJECTION, SOLUTION INTRAVENOUS; SUBCUTANEOUS at 12:11

## 2018-01-29 ASSESSMENT — PAIN DESCRIPTION - DESCRIPTORS
DESCRIPTORS: ACHING
DESCRIPTORS: SHARP
DESCRIPTORS: ACHING
DESCRIPTORS: BURNING;ACHING

## 2018-01-29 NOTE — PLAN OF CARE
Problem: Patient Care Overview  Goal: Plan of Care/Patient Progress Review  Outcome: No Change  POD4 Hand-assisted laparoscopic partial right hepatectomy with resection of liver segments 6 and 7, Laparoscopic cholecystectomy, Intraoperative hepatic ultrasound for  Hepatitis B related hepatic fibrosis,Hepatocellular carcinoma(per MD note).   VS: VSS except tachy in the 110s-120s.  Neuro: A+Ox4  Cardiac: Tachy, regular beats, no c/o's chest pain.             Respiratory: Clear, bilaterally  GI/: +BS, passing flatus, Voiding spontaneously, good uop.  Diet/appetite: Tolerating full liquid diet. BGs covered with insulin per sliding scale.  Activity: SBA with ambulation. IS encouraged.  Pain: Pain controlled with prn oxycodone, 1x toradol and scheduled ibuprofen. Pain mostly on rt side/surery side  Skin: rt lateral incision and lap sites with intact derma bond, lateral side with large bruise.  Lines: Left had infusing with phos replacement.  Will continue to monitor VS, pain, incision and gen status and continue with POC.   Ok for bedside report if awake.     Addendum 7:30 PM  Potassium tabs given for low K+. HR in the 90s now. Large uop.

## 2018-01-29 NOTE — PLAN OF CARE
Problem: Patient Care Overview  Goal: Plan of Care/Patient Progress Review  Outcome: Improving  Continues to be tachycardic with HR in the 110's, otherwise AVSS. Pt reports abdominal pain, given 10 mg of oxycodone x2. Pt also had severe hiccups, given PO thorazine. Denies nausea, on a full liquid diet. BG checks WNL. Pt was incontinent of a loose/watery BM overnight. Voiding adequate amounts. Abdominal incision is c/d/i with significant but unchanged ecchymosis. Up with minimal SBA. Continue to monitor pain control, GI/ function and surgical site.

## 2018-01-29 NOTE — PROGRESS NOTES
Surgical Oncology Progress Note    Interval History:  Continued tachycardic to 114 this morning. Work up yesterday with EKG, CXR, troponin and UA negative. Abdominal pain around his incision. Tolerating full liquid diet. Denies nausea or vomiting. Hiccupping overnight. Passing flatus and had a loose bowel movement overnight.     Physical Exam:   Temp:  [96.2  F (35.7  C)-97.3  F (36.3  C)] 96.2  F (35.7  C)  Heart Rate:  [] 114  Resp:  [15-16] 15  BP: (128-139)/(70-84) 139/84  SpO2:  [92 %-97 %] 92 %  General: Alert, oriented, appears comfortable, NAD.  Respiratory: breathing non labored  Abdomen: Abdomen is soft, tender mostly around incision, non-distended. Incision is clean, dry and intact.   Lower extremities without swelling.     Data:   All laboratory and imaging data in the past 24 hours reviewed  I/O last 3 completed shifts:  In: 480 [P.O.:480]  Out: 500 [Urine:500]  Recent Labs   Lab Test  01/28/18   1112  01/27/18   0802  01/26/18   1828  01/26/18   0825   01/25/18   1523   WBC  9.2  12.8*  14.2*  14.3*   --   18.0*   HGB  12.0*  12.7*  13.0*  13.5   --   14.1   PLT  138*  112*  132*  140*   < >  153   INR   --    --    --   1.17*   --   1.01    < > = values in this interval not displayed.      Recent Labs   Lab Test  01/28/18   1112  01/27/18   0802  01/26/18   1743   NA  134  135  135   POTASSIUM  4.1  4.2  4.0   CHLORIDE  96  97  98   CO2  30  33*  30   BUN  12  9  11   CR  0.61*  0.81  0.72   ANIONGAP  8  5  7   HOANG  8.5  8.6  8.5   GLC  154*  152*  176*     Recent Labs   Lab Test  01/28/18   1112   PROTTOTAL  7.3   ALBUMIN  3.0*   BILITOTAL  1.1   ALKPHOS  62   AST  207*   ALT  458*     Assessment and Plan:     Ceasar Hui is a 57 year old male POD 4 s/p hand assisted lap partial right hepatectomy. Tachycardic with negative work up.     - Morning labs pending  - advance to regular diet  - Oxycodone for pain. Toradol x1, start ibuprofen later today    - SSI, restart home metformin and glyburide   -  Lovenox     Discussed with Dr. Enrique Martinez, PA-C   Surgical Oncology

## 2018-01-29 NOTE — PLAN OF CARE
Problem: Patient Care Overview  Goal: Plan of Care/Patient Progress Review  OT 7C  Discharge Planner OT   Patient plan for discharge: Home with assist  Current status: Pt engaging in shower task. Pt rushing through activity 2/2 anxiety per pt report, requiring cues to slow down to promote safety. Pt demonstrating SBA for doffing socks and gown while standing using figure four technique and holding onto grab bar. Pt completing shower transfer with grab bars SBA and engaging in bathing alternating between standing and sitting mostly SBA but requiring CGA 2/2 mild LOB x 2 with STS from shower chair. Pt adhering to precautions donning socks and shorts while seated. Min A for donning gown. Pt and spouse educated on having family member present, SBA for shower task at home. Pt demonstrating SBA for supine <> sitting EOB using log roll technique.   Barriers to return to prior living situation: surgical precautions, activity tolerance, anxiety  Recommendations for discharge: home with supervision for bathing and assist for heavy IADLs  Rationale for recommendations: Pt somewhat impulsive with activity and would benefit from supervision with bathing task 2/2 safety implications; assist for heavy IADLs given abdominal precautions.        Entered by: Kaia Martinez 01/29/2018 2:07 PM

## 2018-01-29 NOTE — PLAN OF CARE
Problem: Patient Care Overview  Goal: Plan of Care/Patient Progress Review  PT 7C: up with gait belt and hands on gait belt if ambulating without FWW - wife Desi completed training today to guard during gait with suggestion to stay on unit, in view of staff and near wall/railing when ambulating     Discharge Planner PT   Patient plan for discharge: home with family  Current status: Bed mobility SBA with improved adherence to abd precautions, sit <> stand CGA, gait without AD at CGA with intermittent light min A secondary to sway and mild LOB. Progressed to stairs with SBA.  Gait distance, speed and fluidity greatly improved over session and pt incorporated teaching on how to use cane.  Will need to determine if cane vs. No AD for gait for discharge, which pt reports may be as soon as tomorrow (Tuesday)  Barriers to return to prior living situation: medical status, home set up, good social support  Recommendations for discharge: home with home PT to progress balance/gait, and assist from family as needed to maintain safety and precautions  Rationale for recommendations: Pt is below his baseline for mobility, but is improving his mobility. Pt remains limited by pain, impaired functional strength, and impaired balance. Family is very supportive and tending to all pt's needs.

## 2018-01-30 ENCOUNTER — APPOINTMENT (OUTPATIENT)
Dept: PHYSICAL THERAPY | Facility: CLINIC | Age: 58
DRG: 406 | End: 2018-01-30
Attending: SURGERY
Payer: COMMERCIAL

## 2018-01-30 ENCOUNTER — APPOINTMENT (OUTPATIENT)
Dept: OCCUPATIONAL THERAPY | Facility: CLINIC | Age: 58
DRG: 406 | End: 2018-01-30
Attending: SURGERY
Payer: COMMERCIAL

## 2018-01-30 ENCOUNTER — OFFICE VISIT (OUTPATIENT)
Dept: INTERPRETER SERVICES | Facility: CLINIC | Age: 58
End: 2018-01-30
Payer: COMMERCIAL

## 2018-01-30 VITALS
WEIGHT: 144.4 LBS | SYSTOLIC BLOOD PRESSURE: 149 MMHG | DIASTOLIC BLOOD PRESSURE: 82 MMHG | BODY MASS INDEX: 22.66 KG/M2 | OXYGEN SATURATION: 96 % | HEART RATE: 111 BPM | RESPIRATION RATE: 16 BRPM | HEIGHT: 67 IN | TEMPERATURE: 97.9 F

## 2018-01-30 LAB
ALBUMIN SERPL-MCNC: 3.1 G/DL (ref 3.4–5)
ALP SERPL-CCNC: 71 U/L (ref 40–150)
ALT SERPL W P-5'-P-CCNC: 284 U/L (ref 0–70)
ANION GAP SERPL CALCULATED.3IONS-SCNC: 10 MMOL/L (ref 3–14)
AST SERPL W P-5'-P-CCNC: 95 U/L (ref 0–45)
BILIRUB DIRECT SERPL-MCNC: 0.2 MG/DL (ref 0–0.2)
BILIRUB SERPL-MCNC: 1 MG/DL (ref 0.2–1.3)
BUN SERPL-MCNC: 9 MG/DL (ref 7–30)
CALCIUM SERPL-MCNC: 8.9 MG/DL (ref 8.5–10.1)
CHLORIDE SERPL-SCNC: 98 MMOL/L (ref 94–109)
CO2 SERPL-SCNC: 26 MMOL/L (ref 20–32)
COPATH REPORT: NORMAL
CREAT SERPL-MCNC: 0.65 MG/DL (ref 0.66–1.25)
ERYTHROCYTE [DISTWIDTH] IN BLOOD BY AUTOMATED COUNT: 13.5 % (ref 10–15)
GFR SERPL CREATININE-BSD FRML MDRD: >90 ML/MIN/1.7M2
GLUCOSE BLDC GLUCOMTR-MCNC: 138 MG/DL (ref 70–99)
GLUCOSE BLDC GLUCOMTR-MCNC: 146 MG/DL (ref 70–99)
GLUCOSE BLDC GLUCOMTR-MCNC: 153 MG/DL (ref 70–99)
GLUCOSE SERPL-MCNC: 151 MG/DL (ref 70–99)
HCT VFR BLD AUTO: 38.8 % (ref 40–53)
HGB BLD-MCNC: 12.3 G/DL (ref 13.3–17.7)
INTERPRETATION ECG - MUSE: NORMAL
MAGNESIUM SERPL-MCNC: 2.2 MG/DL (ref 1.6–2.3)
MCH RBC QN AUTO: 26.3 PG (ref 26.5–33)
MCHC RBC AUTO-ENTMCNC: 31.7 G/DL (ref 31.5–36.5)
MCV RBC AUTO: 83 FL (ref 78–100)
PHOSPHATE SERPL-MCNC: 2.6 MG/DL (ref 2.5–4.5)
PHOSPHATE SERPL-MCNC: 2.8 MG/DL (ref 2.5–4.5)
PLATELET # BLD AUTO: 152 10E9/L (ref 150–450)
POTASSIUM SERPL-SCNC: 3.3 MMOL/L (ref 3.4–5.3)
PROT SERPL-MCNC: 7.6 G/DL (ref 6.8–8.8)
RBC # BLD AUTO: 4.68 10E12/L (ref 4.4–5.9)
SODIUM SERPL-SCNC: 134 MMOL/L (ref 133–144)
WBC # BLD AUTO: 5.7 10E9/L (ref 4–11)

## 2018-01-30 PROCEDURE — 97110 THERAPEUTIC EXERCISES: CPT | Mod: GP | Performed by: REHABILITATION PRACTITIONER

## 2018-01-30 PROCEDURE — 36415 COLL VENOUS BLD VENIPUNCTURE: CPT | Performed by: SURGERY

## 2018-01-30 PROCEDURE — 40000133 ZZH STATISTIC OT WARD VISIT: Performed by: OCCUPATIONAL THERAPIST

## 2018-01-30 PROCEDURE — 25000132 ZZH RX MED GY IP 250 OP 250 PS 637: Performed by: SURGERY

## 2018-01-30 PROCEDURE — 40000193 ZZH STATISTIC PT WARD VISIT: Performed by: REHABILITATION PRACTITIONER

## 2018-01-30 PROCEDURE — 00000146 ZZHCL STATISTIC GLUCOSE BY METER IP

## 2018-01-30 PROCEDURE — 25000132 ZZH RX MED GY IP 250 OP 250 PS 637: Performed by: PHYSICIAN ASSISTANT

## 2018-01-30 PROCEDURE — 97530 THERAPEUTIC ACTIVITIES: CPT | Mod: GP | Performed by: REHABILITATION PRACTITIONER

## 2018-01-30 PROCEDURE — 83735 ASSAY OF MAGNESIUM: CPT | Performed by: SURGERY

## 2018-01-30 PROCEDURE — 80048 BASIC METABOLIC PNL TOTAL CA: CPT | Performed by: SURGERY

## 2018-01-30 PROCEDURE — 97535 SELF CARE MNGMENT TRAINING: CPT | Mod: GO | Performed by: OCCUPATIONAL THERAPIST

## 2018-01-30 PROCEDURE — T1013 SIGN LANG/ORAL INTERPRETER: HCPCS | Mod: U3

## 2018-01-30 PROCEDURE — 97116 GAIT TRAINING THERAPY: CPT | Mod: GP | Performed by: REHABILITATION PRACTITIONER

## 2018-01-30 PROCEDURE — 84100 ASSAY OF PHOSPHORUS: CPT | Performed by: SURGERY

## 2018-01-30 PROCEDURE — 80076 HEPATIC FUNCTION PANEL: CPT | Performed by: SURGERY

## 2018-01-30 PROCEDURE — 97750 PHYSICAL PERFORMANCE TEST: CPT | Mod: GP | Performed by: REHABILITATION PRACTITIONER

## 2018-01-30 PROCEDURE — 85027 COMPLETE CBC AUTOMATED: CPT | Performed by: SURGERY

## 2018-01-30 RX ORDER — IBUPROFEN 600 MG/1
600 TABLET, FILM COATED ORAL EVERY 6 HOURS PRN
Qty: 50 TABLET | Refills: 0 | Status: SHIPPED | OUTPATIENT
Start: 2018-01-30

## 2018-01-30 RX ADMIN — INSULIN ASPART 1 UNITS: 100 INJECTION, SOLUTION INTRAVENOUS; SUBCUTANEOUS at 07:50

## 2018-01-30 RX ADMIN — IBUPROFEN 600 MG: 600 TABLET ORAL at 12:33

## 2018-01-30 RX ADMIN — INSULIN ASPART 1 UNITS: 100 INJECTION, SOLUTION INTRAVENOUS; SUBCUTANEOUS at 12:36

## 2018-01-30 RX ADMIN — OXYCODONE HYDROCHLORIDE 10 MG: 5 TABLET ORAL at 12:33

## 2018-01-30 RX ADMIN — OXYCODONE HYDROCHLORIDE 10 MG: 5 TABLET ORAL at 03:05

## 2018-01-30 RX ADMIN — OXYCODONE HYDROCHLORIDE 10 MG: 5 TABLET ORAL at 07:46

## 2018-01-30 RX ADMIN — RANITIDINE 150 MG: 150 TABLET, FILM COATED ORAL at 07:46

## 2018-01-30 RX ADMIN — IBUPROFEN 600 MG: 600 TABLET ORAL at 07:46

## 2018-01-30 RX ADMIN — POTASSIUM CHLORIDE 20 MEQ: 750 TABLET, EXTENDED RELEASE ORAL at 12:33

## 2018-01-30 RX ADMIN — TENOFOVIR DISOPROXIL FUMARATE 300 MG: 300 TABLET, COATED ORAL at 07:46

## 2018-01-30 RX ADMIN — ATORVASTATIN CALCIUM 40 MG: 40 TABLET, FILM COATED ORAL at 07:46

## 2018-01-30 RX ADMIN — POTASSIUM CHLORIDE 40 MEQ: 750 TABLET, EXTENDED RELEASE ORAL at 10:14

## 2018-01-30 ASSESSMENT — PAIN DESCRIPTION - DESCRIPTORS
DESCRIPTORS: NUMBNESS
DESCRIPTORS: ACHING;BURNING

## 2018-01-30 NOTE — PROGRESS NOTES
Surgical Oncology Progress Note    Interval History:  No acute events overnight. Pain controlled. Tolerating liquid diet and feels hungry. Denies nausea. Denies flatus or bowel movements. CT yesterday with right pleural effusion, negative for PE. Tachycardia resolved.     Physical Exam:   Temp:  [98.5  F (36.9  C)-99.4  F (37.4  C)] 98.5  F (36.9  C)  Heart Rate:  [] 84  Resp:  [15-18] 18  BP: (126-131)/(72-73) 128/73  SpO2:  [96 %] 96 %  General: Alert, oriented, appears comfortable, NAD.  Respiratory: breathing non labored  Abdomen: Abdomen is soft, non-tender, non-distended. Incision is clean, dry and intact. Right bruise around incision and flank stable.      Data:   All laboratory and imaging data in the past 24 hours reviewed  I/O last 3 completed shifts:  In: 1790 [P.O.:1790]  Out: 2375 [Urine:2375]  Recent Labs   Lab Test  01/29/18   0704  01/28/18   1112  01/27/18   0802   01/26/18   0825   01/25/18   1523   WBC  7.0  9.2  12.8*   < >  14.3*   --   18.0*   HGB  11.9*  12.0*  12.7*   < >  13.5   --   14.1   PLT  140*  138*  112*   < >  140*   < >  153   INR   --    --    --    --   1.17*   --   1.01    < > = values in this interval not displayed.      Recent Labs   Lab Test  01/29/18   0704  01/28/18   1112  01/27/18   0802   NA  132*  134  135   POTASSIUM  3.3*  4.1  4.2   CHLORIDE  92*  96  97   CO2  30  30  33*   BUN  11  12  9   CR  0.64*  0.61*  0.81   ANIONGAP  10  8  5   HOANG  8.3*  8.5  8.6   GLC  168*  154*  152*     Recent Labs   Lab Test  01/29/18   0704   PROTTOTAL  7.2   ALBUMIN  2.9*   BILITOTAL  1.0   ALKPHOS  74   AST  142*   ALT  360*     Assessment and Plan:     Ceasar Hui is a 57 year old male POD 5 s/p hand assisted lap partial right hepatectomy. Tachycardia resolved.      - regular diet  - Oxycodone and ibuprofen for pain   - SSI home metformin and glyburide   - Lovenox   - will discuss possible aspiration of right pleural effusion with Dr. Nunez.     Korin Martinez PA-C    Surgical Oncology

## 2018-01-30 NOTE — PLAN OF CARE
Problem: Patient Care Overview  Goal: Plan of Care/Patient Progress Review  Occupational Therapy Discharge Summary    Reason for therapy discharge:    All goals and outcomes met, no further needs identified.    Progress towards therapy goal(s). See goals on Care Plan in [facility] electronic health record for goal details.  Pt. Niurka/indep. with BADLs and functional mobility.  Recommend SBA/CGA initially with bathing at home.     Therapy recommendation(s):    No further therapy is recommended.

## 2018-01-30 NOTE — PROVIDER NOTIFICATION
Notified Resident at 7:50 PM regarding change in condition.      Paged: Janet Gardiner MD #[phone]     Comments: Large maroon area around incision and extending to flank noted, MD notified.

## 2018-01-30 NOTE — PLAN OF CARE
Problem: Patient Care Overview  Goal: Plan of Care/Patient Progress Review  Outcome: No Change  Assumed care of patient 1930. AVSS on RA. Continuous pulse ox on. A+OX4. Pain controlled with prn oxycodone. Denies nausea. Regular diet with fair appetite. Voiding large amounts. Passing gas, no BM. Transverse incision dermabond c/d/i, maroon area surrounding and extending to flank, MD notified. Abdomen distended. Up with SBA, steady on feet. Wife at bedside     Continue POC. Patient OK with bedside report.

## 2018-01-30 NOTE — PLAN OF CARE
Problem: Patient Care Overview  Goal: Plan of Care/Patient Progress Review  7C - Pt's mobility much improved, good adherence to abdominal precautions, thus no longer needs home PT.  Pt progressed to SBA with gait without AD with balance challenges.  Would like a cane to promote ambulation outdoors - son will provide cane as has 3 on hand & pt in agreement with this.    Physical Therapy Discharge Summary  Reason for discharge:   Discharge from hospital to home scheduled later today  Progress towards goals: See goals on Care Plan in [facility] electronic health record for goal details.  MET: bed mobility, transfers, and gait without AD, stairs with SBA with 1 rail (has rail at home, and CGA without rail), and met abdominal prec goal. Goals partially met. Barriers to achieving goals: limited tolerance,    Recommendation(s):   Continued therapy is not recommended. Rationale/Recommendations: Pt making rapid progress on own, wife can provide 24/& A as needed and son is supportive as well,  TCU to increase safety and independence with basic functional mobility, and to address unmet goals.

## 2018-01-30 NOTE — PLAN OF CARE
Problem: Patient Care Overview  Goal: Plan of Care/Patient Progress Review  Outcome: Adequate for Discharge Date Met: 01/30/18  VSS. Pain controlled with scheduled ibuprofen and PRN Oxy. Denies nausea. Tolerating regular diet. Incisions CDI, ecchymosis unchanged. Up ad amparo. Voiding adequate amounts. +gas, no BM this shift. Went over AVS with pt, wife and son.  present. PIV removed. No questions at this time. Adequate for discharge.

## 2018-01-30 NOTE — PROGRESS NOTES
"01/29/2018  General Surgery Cross Cover Note    Was called to bedside to evaluate patient Ceasar Hui for complaints of incisional bruising. Patient was seen in NAD. He is not complaining of pain around the incision site. He is concerned about the bruising. Also not sleeping well.     Exam:   /73 (BP Location: Right arm)  Pulse 111  Temp 98.5  F (36.9  C) (Oral)  Resp 18  Ht 1.702 m (5' 7\")  Wt 65.5 kg (144 lb 6.4 oz)  SpO2 96%  BMI 22.62 kg/m2  General: Alert, interactive, & in NAD  Resp: Nonlabored breathing on RA  Cardiac: Regular rate  Abdomen: Soft, nontender, nondistended.  Incision: c/d/i without erythema, warmth, discharge. He has significant ecchymoses around the incision. In addition, he has a developing stitch granuloma at the midline laparoscopy incision site.   Extremities: No LE edema or obvious joint abnormalities  Skin: Warm and dry, no jaundice or rash    I/O last 3 completed shifts:  In: 1320 [P.O.:1320]  Out: 500 [Urine:500]    Assessment:  Ceasar Hui is a 57 year old male POD # 4 s/p hand assisted lap partial R hepatectomy.    Plan:  Incision has a large amount of ecchymoses, no active bleeding noted.   There is a possible stitch granuloma at the midline incision  Will add benadryl PRN to see if that helps with sleep    Fabrice Cowart DO   General Surgery PGY1  [phone]  (After 6AM please page primary team)  "

## 2018-01-30 NOTE — DISCHARGE SUMMARY
[facility] Discharge Summary    Ceasar Hui MRN# [id number]   Age: 57 year old YOB: 1960     Date of Admission:  1/25/2018  Date of Discharge::  1/30/2018  Admitting Physician:  Esequiel Nunez MD  Discharge Physician:  Esequiel Nunez MD     [facility]    Disposition: Patient discharged to [facility] in stable condition.    Admission Diagnosis:  Hepatocellular carcinoma  Hepatitis B  Diabetes mellitus type 2  Hyperlipidemia  Vitamin D deficiency  Depression    Discharge Diagnosis:  Hepatocellular carcinoma  Tachycardia   Hypokalemia  Hyperglycemia  Right pleural effusion   Hepatitis B  Diabetes mellitus type 2  Hyperlipidemia  Vitamin D deficiency  Depression    Current Discharge Medication List      START taking these medications    Details   ibuprofen (ADVIL/MOTRIN) 600 MG tablet Take 1 tablet (600 mg) by mouth every 6 hours as needed for moderate pain  Qty: 50 tablet, Refills: 0    Associated Diagnoses: Acute post-operative pain      oxyCODONE IR (ROXICODONE) 5 MG tablet Take 1-2 tablets (5-10 mg) by mouth every 4 hours as needed for moderate to severe pain  Qty: 30 tablet, Refills: 0    Associated Diagnoses: Hepatocellular carcinoma (H)      enoxaparin (LOVENOX) 40 MG/0.4ML injection Inject 0.4 mLs (40 mg) Subcutaneous every 24 hours for 24 days  Qty: 9.6 mL, Refills: 0    Associated Diagnoses: Deep vein thrombosis (DVT) prophylaxis prescribed at discharge      polyethylene glycol (MIRALAX/GLYCOLAX) Packet Take 17 g by mouth daily as needed for constipation  Qty: 14 packet, Refills: 0    Associated Diagnoses: Constipation, unspecified constipation type         CONTINUE these medications which have NOT CHANGED    Details   atorvastatin (LIPITOR) 40 MG tablet Take 40 mg by mouth      tenofovir (VIREAD) 300 MG tablet Take 300 mg by mouth      metFORMIN (GLUCOPHAGE) 1000 MG tablet Take 1,000 mg by mouth      glyBURIDE (DIABETA /MICRONASE) 5 MG tablet  Take 5 mg by mouth      Cholecalciferol (VITAMIN D3) 1000 UNITS CAPS Take 1,000 Units by mouth      simethicone (MYLICON) 125 MG CHEW chewable tablet Take 125 mg by mouth      Lancets MISC Testing blood sugars 2 time(s) a day. Pharmacist may substitute meter/supplies if insurance or patient requires specific equipment or model      Glucose Blood (BLOOD GLUCOSE TEST STRIPS) STRP two times a day. Use as directed. Pharmacy dispense brand based on insurance.             Follow up, Special Instructions:  Diet: regular  Activities: No lifting more than 15 pounds for 6 weeks. Frequent ambulation   Wound care: okay to shower  Drain care: no drains  Follow up: Follow up with Dr. Nunez in 2 weeks Follow up with Primary care provider within 1 week.     Procedures:  1/25/17 (Dr. Nunez)   1.  Hand-assisted laparoscopic partial right hepatectomy with resection of liver segments 6 and 7.   2.  Laparoscopic cholecystectomy.   3.  Intraoperative hepatic ultrasound.     Consultations:  PHYSICAL THERAPY ADULT IP CONSULT  OCCUPATIONAL THERAPY ADULT IP CONSULT  VASCULAR ACCESS CARE ADULT IP CONSULT    Brief HPI:  Ceasar Hui is a 57 year old male with hepatitis B related hepatic fibrosis and hepatocellular carcinoma.     Hospital Course:  The patient was admitted and underwent the above procedure. The patient tolerated the procedure well. He developed tachycardia to 150's with low grade fever to 101.1 on post operative day 3. Work up included chest x-ray with atelectasis, troponins not elevated, and EKG with sinus tachycardia. His temperature returned to normal but he remained tachycardic to the 120's. Repeat EKG revealed sinus tachycardia and chest CT was negative for pulmonary embolism. The CT scan did show a right pleural effusion. His tachycardia resolved. Prior to discharge pain was controlled with oral pain medication and the patient was able to ambulate and void without difficulty. He was tolerating a regular diet. The patient  received appropriate education post operatively. On POD #5 the patient was discharged to home.    Surgical pathology  Pending     Korin Martinez PA-C

## 2018-01-30 NOTE — PROGRESS NOTES
30-Second Sit to Stand Test:  The test is conducted with a straight back chair, without armrests, with a 17-inch seat height.    Patient Score (reps): 7    The 30 Second Sit to Stand Test is considered a test of fall risk.  Data from SOPHIA CEDENO, cosponsored by [place] Dept of Health:  8 or less times = High Fall Risk   9 to 12 times = Moderate Risk  13 or more times = Low Risk    The 30 Second Sit to Stand Test is also considered a test of leg strength and endurance.   Normative Data from Jeffery et al,. 2001  Age                 Reps: Men        Reps: Women  60-64                14-19                       12-17                               65-69                12-18                       11-16                    70-74                12-17                       10-15              75-79                11-17                       10-15                    80-84                10-15                         9-14  85-89                 8-14                          8-13  90-94                 7-12                          4-11    Timed Up and Go (TUG): TUG is a test of basic mobility skills. It is used to screen individuals prone to falls.  Gait assistive device used: none    Patient score 11.7 seconds  ?13.5 seconds indicate at risk for falls in older adults according to Gretel et al 2000.  ?30 seconds - indicates dependency in most ADL and mobility skills according to Posiadlo & Snow 1991  >11.5 seconds indicate at risk for falls in adults with Parkinson's Disease    Minimal Detectable Change for patients with Alzheimer s = 4.09 sec   Minimal Detectable Change for patients with Parkinson s Disease = 3.5 sec   according to La & Natty Stokes 2011    Assessment (rationale for performing, application to patient s function & care plan): Pt discharging home today, unsure if needs cane or FWW for safety with ambulation with disch.  Pt scoring increased fall risk with 30 sec STS, however, pt indicates abdominal  cramping was limiting performance and pt scored low fall risk with TUG, and was able to SLS in middle of taylor while pulling up his other sock. Issued standing LE strength/balance ex to progress balance at home.  Cane not required, but since pt would like to use one if outdoors and demonstrated correct technique, he may do so during initial recovery period.  Minutes billed as physical performance test: 8

## 2018-01-31 ENCOUNTER — CARE COORDINATION (OUTPATIENT)
Dept: SURGERY | Facility: CLINIC | Age: 58
End: 2018-01-31

## 2018-01-31 NOTE — PROGRESS NOTES
Post Op Discharge Call    Surgery:   1.  Hand-assisted laparoscopic partial right hepatectomy with resection of liver segments 6 and 7.   2.  Laparoscopic cholecystectomy.   3.  Intraoperative hepatic ultrasound    Surgery date: 1/25/2018    Discharge Date:  1/30/18    Immediate Concerns: None at this time.     Pain: No issues with pain management. Per patients son he is not using the oxycodone at this time. He is using advil for relief. Discussed he can also use ice and heat.     Incision: Healing well, no redness, son reports he noted some drainage from the incision but its clear. Informed him to continue to monitor and if it increases or become red to call our office.     Drains: No drains.     Diet: Regular, tolerating well. Denies nausea, vomiting.     Bowels:  Passing gas. aking stool softeners daily. Has not had bowel movement but will continue taking stool softeners as directed.     Activity: No difficulty with ADLs reported. Patient up independently at home.     Post op/follow up plans: Post op appointment scheduled, confirmed date and time with patient.     Patient has our direct number for any questions or concerns that may arise.      Madisyn Tyler, RN, BSN  Care Coordinator - Dr. Nunez  [phone]

## 2018-02-12 ENCOUNTER — OFFICE VISIT (OUTPATIENT)
Dept: SURGERY | Facility: CLINIC | Age: 58
End: 2018-02-12
Attending: SURGERY
Payer: MEDICARE

## 2018-02-12 VITALS
TEMPERATURE: 98.3 F | HEART RATE: 91 BPM | OXYGEN SATURATION: 98 % | WEIGHT: 142.4 LBS | HEIGHT: 67 IN | RESPIRATION RATE: 16 BRPM | BODY MASS INDEX: 22.35 KG/M2 | DIASTOLIC BLOOD PRESSURE: 63 MMHG | SYSTOLIC BLOOD PRESSURE: 96 MMHG

## 2018-02-12 DIAGNOSIS — C22.0 HCC (HEPATOCELLULAR CARCINOMA) (H): Primary | ICD-10-CM

## 2018-02-12 PROCEDURE — G0463 HOSPITAL OUTPT CLINIC VISIT: HCPCS | Mod: ZF

## 2018-02-12 PROCEDURE — T1013 SIGN LANG/ORAL INTERPRETER: HCPCS | Mod: U3,ZF

## 2018-02-12 NOTE — MR AVS SNAPSHOT
"              After Visit Summary   2018    Ceasar Hui    MRN: [id number]           Patient Information     Date Of Birth          1960        Visit Information        Provider Department      2018 9:00 AM Mary Lou Echevarria; Esequiel Nunez MD [facility]        Today's Diagnoses     HCC (hepatocellular carcinoma) (H)    -  1       Follow-ups after your visit        Who to contact     If you have questions or need follow up information about today's clinic visit or your schedule please contact [facility] directly at [phone].  Normal or non-critical lab and imaging results will be communicated to you by Construct[employer], letter or phone within 4 business days after the clinic has received the results. If you do not hear from us within 7 days, please contact the clinic through [employer] or phone. If you have a critical or abnormal lab result, we will notify you by phone as soon as possible.  Submit refill requests through viseto or call your pharmacy and they will forward the refill request to us. Please allow 3 business days for your refill to be completed.          Additional Information About Your Visit        MyChart Information     viseto lets you send messages to your doctor, view your test results, renew your prescriptions, schedule appointments and more. To sign up, go to [email or web address] . Click on \"Log in\" on the left side of the screen, which will take you to the Welcome page. Then click on \"Sign up Now\" on the right side of the page.     You will be asked to enter the access code listed below, as well as some personal information. Please follow the directions to create your username and password.     Your access code is: [email or web address]  Expires: 2018  6:30 AM     Your access code will  in 90 days. If you need help or a new code, please call your [place] clinic or [phone].        Care EveryWhere ID     This is your Care EveryWhere ID. This " "could be used by other organizations to access your [facility] medical records  [id number]        Your Vitals Were     Pulse Temperature Respirations Height Pulse Oximetry BMI (Body Mass Index)    91 98.3  F (36.8  C) (Oral) 16 1.702 m (5' 7\") 98% 22.3 kg/m2       Blood Pressure from Last 3 Encounters:   02/12/18 96/63   01/30/18 149/82   01/18/18 115/73    Weight from Last 3 Encounters:   02/12/18 64.6 kg (142 lb 6.4 oz)   01/29/18 65.5 kg (144 lb 6.4 oz)   01/18/18 67 kg (147 lb 11.3 oz)              Today, you had the following     No orders found for display       Primary Care Provider Office Phone # Fax #    [facility] [phone]       [address] [place] [address]        Equal Access to Services     JAMA CASTELLANOS : Hadii minna Brumfield, waaxda lumarcelo, qaybta kaalmada adealfieyadavide, meghan martin . So [facility] [phone].    ATENCIÓN: Si gregorio espolivia, tiene a angelo disposición servicios gratuitos de asistencia lingüística. Llame al [phone].    We comply with applicable federal civil rights laws and [place] laws. We do not discriminate on the basis of race, color, national origin, age, disability, sex, sexual orientation, or gender identity.            Thank you!     Thank you for choosing [facility]  for your care. Our goal is always to provide you with excellent care. Hearing back from our patients is one way we can continue to improve our services. Please take a few minutes to complete the written survey that you may receive in the mail after your visit with us. Thank you!             Your Updated Medication List - Protect others around you: Learn how to safely use, store and throw away your medicines at www.disposemymeds.org.          This list is accurate as of 2/12/18  9:33 AM.  Always use your most recent med list.                   Brand Name Dispense Instructions for use Diagnosis    atorvastatin 40 MG " tablet    LIPITOR     Take 40 mg by mouth        BLOOD GLUCOSE TEST STRIPS Strp      two times a day. Use as directed. Pharmacy dispense brand based on insurance.        enoxaparin 40 MG/0.4ML injection    LOVENOX    9.6 mL    Inject 0.4 mLs (40 mg) Subcutaneous every 24 hours for 24 days    Deep vein thrombosis (DVT) prophylaxis prescribed at discharge       glyBURIDE 5 MG tablet    DIABETA /MICRONASE     Take 5 mg by mouth        ibuprofen 600 MG tablet    ADVIL/MOTRIN    50 tablet    Take 1 tablet (600 mg) by mouth every 6 hours as needed for moderate pain    Acute post-operative pain       Lancets Misc      Testing blood sugars 2 time(s) a day. Pharmacist may substitute meter/supplies if insurance or patient requires specific equipment or model        metFORMIN 1000 MG tablet    GLUCOPHAGE     Take 1,000 mg by mouth        oxyCODONE IR 5 MG tablet    ROXICODONE    30 tablet    Take 1-2 tablets (5-10 mg) by mouth every 4 hours as needed for moderate to severe pain    Hepatocellular carcinoma (H)       polyethylene glycol Packet    MIRALAX/GLYCOLAX    14 packet    Take 17 g by mouth daily as needed for constipation    Constipation, unspecified constipation type       simethicone 125 MG Chew chewable tablet    MYLICON     Take 125 mg by mouth        tenofovir 300 MG tablet    VIREAD     Take 300 mg by mouth        vitamin D3 1000 UNITS Caps      Take 1,000 Units by mouth

## 2018-02-12 NOTE — PROGRESS NOTES
Looks great  Feels well  No fevers or problems    Incisions look good, there is mild erythema ay one right lower quad incision but no drainage, and does not appear infected    Recommended keeping an eye on the one incision, but otherwise looked great  Instructed to call if any worsening redness, drainage, fevers, or not feeling well  Otherwise follow up with Dr. Stock as scheduled

## 2018-02-12 NOTE — NURSING NOTE
"Oncology Rooming Note    February 12, 2018 9:23 AM   Ceasar Hui is a 57 year old male who presents for:    Chief Complaint   Patient presents with     Oncology Clinic Visit     return patient visit for post surgery follow up related to Hepatocellular carcinoma (H)     Initial Vitals: BP 96/63  Pulse 91  Temp 98.3  F (36.8  C) (Oral)  Resp 16  Ht 1.702 m (5' 7\")  Wt 64.6 kg (142 lb 6.4 oz)  SpO2 98%  BMI 22.3 kg/m2 Estimated body mass index is 22.3 kg/(m^2) as calculated from the following:    Height as of this encounter: 1.702 m (5' 7\").    Weight as of this encounter: 64.6 kg (142 lb 6.4 oz). Body surface area is 1.75 meters squared.  Data Unavailable Comment: Data Unavailable   No LMP for male patient.  Allergies reviewed: No  Medications reviewed: No    Medications: Medication refills not needed today.  Pharmacy name entered into [facility]: [facility] - [place], [place] - [address] [facility] [address]    Clinical concerns: no concerns - patient  was over 20 minutes late. Patient did not want to use  stand. Patient wanted to waive  so that his daughter could interpret for him.  showed up before rooming. Provider wanted to see patient immediately, only obtained vitals for billing - no other rooming stuff done dr. urena was notified.    5 minutes for nursing intake (face to face time)     Juanis Arreguin CMA              "

## 2018-02-12 NOTE — LETTER
2/12/2018       RE: Ceasar Hui  [address]  [place] [address]     Dear Colleague,    Thank you for referring your patient, Ceasar Hui, to [facility]. Please see a copy of my visit note below.    Looks great  Feels well  No fevers or problems    Incisions look good, there is mild erythema ay one right lower quad incision but no drainage, and does not appear infected    Recommended keeping an eye on the one incision, but otherwise looked great  Instructed to call if any worsening redness, drainage, fevers, or not feeling well  Otherwise follow up with Dr. Stock as scheduled    Again, thank you for allowing me to participate in the care of your patient.      Sincerely,    Esequiel Nunez MD

## 2021-04-10 ENCOUNTER — IMMUNIZATION (OUTPATIENT)
Dept: NURSING | Facility: CLINIC | Age: 61
End: 2021-04-10
Payer: MEDICARE

## 2021-04-10 PROCEDURE — 91301 PR COVID VAC MODERNA 100 MCG/0.5 ML IM: CPT

## 2021-04-10 PROCEDURE — 0011A PR COVID VAC MODERNA 100 MCG/0.5 ML IM: CPT

## 2021-05-08 ENCOUNTER — IMMUNIZATION (OUTPATIENT)
Dept: NURSING | Facility: CLINIC | Age: 61
End: 2021-05-08
Attending: OBSTETRICS & GYNECOLOGY
Payer: MEDICARE

## 2021-05-08 PROCEDURE — 91301 PR COVID VAC MODERNA 100 MCG/0.5 ML IM: CPT

## 2021-05-08 PROCEDURE — 0012A PR COVID VAC MODERNA 100 MCG/0.5 ML IM: CPT

## 2021-05-16 ENCOUNTER — HEALTH MAINTENANCE LETTER (OUTPATIENT)
Age: 61
End: 2021-05-16

## 2021-09-05 ENCOUNTER — HEALTH MAINTENANCE LETTER (OUTPATIENT)
Age: 61
End: 2021-09-05

## 2022-06-12 ENCOUNTER — HEALTH MAINTENANCE LETTER (OUTPATIENT)
Age: 62
End: 2022-06-12

## 2022-10-23 ENCOUNTER — HEALTH MAINTENANCE LETTER (OUTPATIENT)
Age: 62
End: 2022-10-23

## 2023-06-24 ENCOUNTER — HEALTH MAINTENANCE LETTER (OUTPATIENT)
Age: 63
End: 2023-06-24

## (undated) DEVICE — SPONGE LAP 18X18" X8435

## (undated) DEVICE — ESU GROUND PAD THERMOGUARD PLUS ABC PEDS 7-382

## (undated) DEVICE — CLIP APPLIER ENDO 05MM MED/LG 176630

## (undated) DEVICE — ESU HARMONIC ACE LAPAROSCOPIC SHEARS 5MMX36CM CVD HAR36

## (undated) DEVICE — ENDO TROCAR 05MM VERSAONE BLADELESS W/STD FIX CAN NONB5STF

## (undated) DEVICE — APPLICATOR COTTON TIP 6"X2 STERILE LF 6012

## (undated) DEVICE — GLOVE PROTEXIS BLUE W/NEU-THERA 8.0  2D73EB80

## (undated) DEVICE — SOL WATER IRRIG 1000ML BOTTLE 2F7114

## (undated) DEVICE — SU DERMABOND ADVANCED .7ML DNX12

## (undated) DEVICE — DRAPE IOBAN INCISE 23X17" 6650EZ

## (undated) DEVICE — ENDO SHEARS 5MM 176643

## (undated) DEVICE — SU MONOCRYL 4-0 PS-2 27" UND Y426H

## (undated) DEVICE — ENDO POUCH GOLD 10MM ECATCH 173050G

## (undated) DEVICE — ENDO GELPORT 100/120MM C8XX2

## (undated) DEVICE — ESU GROUND PAD ADULT W/CORD E7507

## (undated) DEVICE — WIPES FOLEY CARE SURESTEP PROVON DFC100

## (undated) DEVICE — ENDO APPLICATOR ARISTA FLEX TIP AM0005

## (undated) DEVICE — SU SILK 2-0 TIE 12X30" A305H

## (undated) DEVICE — HEMOSTAT ABSORBABLE AGENT ARISTA 3GM POWDER SM0002-USA

## (undated) DEVICE — SUCTION IRR STRYKERFLOW II W/TIP 250-070-520

## (undated) DEVICE — STPL ENDO RELOAD 45X2.5MM ROTIC 030454

## (undated) DEVICE — Device

## (undated) DEVICE — BLADE CLIPPER SGL USE 9680

## (undated) DEVICE — PROBE ABLATION NEUWAVE CERTUS 140 25CM 13GA SR25

## (undated) DEVICE — SOL NACL 0.9% INJ 1000ML BAG 2B1324X

## (undated) DEVICE — SU SILK 3-0 SH 30" K832H

## (undated) DEVICE — STPL ENDO HANDLE GIA ULTRA UNIVERSAL STD EGIAUSTND

## (undated) DEVICE — SURGICEL ABSORBABLE HEMOSTAT SNOW 4"X4" 2083

## (undated) DEVICE — SYSTEM CLEARIFY VISUALIZATION 21-345

## (undated) DEVICE — SU PDS II 0 TP-1 60" Z991G

## (undated) DEVICE — TUBING INSUFFLATION W/FILTER CPC TO LUER 620-030-301

## (undated) DEVICE — ENDO CANNULA 05MM VERSAONE UNIVERSAL UNVCA5STF

## (undated) DEVICE — SU VICRYL 3-0 SH 27" J316H

## (undated) DEVICE — PREP CHLORAPREP 26ML TINTED ORANGE  260815

## (undated) DEVICE — SOL NACL 0.9% IRRIG 1000ML BOTTLE 2F7124

## (undated) DEVICE — ENDO TROCAR 12MM VERSAONE BLADELESS W/STD FIX CAN NONB12STF

## (undated) DEVICE — GLOVE PROTEXIS MICRO 7.5  2D73PM75

## (undated) DEVICE — ENDO TROCAR OPTICAL 12MM VERSAONE W/STD FIX CAN UNVCA12STF

## (undated) DEVICE — CATH TRAY FOLEY SURESTEP 16FR W/URNE MTR STLK LATEX A303316A

## (undated) DEVICE — SU VICRYL 1 CT-1 27" UND J261H

## (undated) DEVICE — SUCTION MANIFOLD DORNOCH ULTRA CART UL-CL500

## (undated) DEVICE — DEVICE SUTURE GRASPER TROCAR CLOSURE 14GA PMITCSG

## (undated) DEVICE — LINEN TOWEL PACK X30 5481

## (undated) DEVICE — LINEN TOWEL PACK X6 WHITE 5487

## (undated) DEVICE — SU SILK 3-0 TIE 12X30" A304H

## (undated) DEVICE — SU SILK 4-0 TIE 12X30" A303H

## (undated) RX ORDER — FENTANYL CITRATE 50 UG/ML
INJECTION, SOLUTION INTRAMUSCULAR; INTRAVENOUS
Status: DISPENSED
Start: 2018-01-25

## (undated) RX ORDER — LABETALOL HYDROCHLORIDE 5 MG/ML
INJECTION, SOLUTION INTRAVENOUS
Status: DISPENSED
Start: 2018-01-25

## (undated) RX ORDER — CEFAZOLIN SODIUM 1 G/3ML
INJECTION, POWDER, FOR SOLUTION INTRAMUSCULAR; INTRAVENOUS
Status: DISPENSED
Start: 2018-01-25

## (undated) RX ORDER — ONDANSETRON 2 MG/ML
INJECTION INTRAMUSCULAR; INTRAVENOUS
Status: DISPENSED
Start: 2018-01-25

## (undated) RX ORDER — SODIUM CHLORIDE, SODIUM LACTATE, POTASSIUM CHLORIDE, CALCIUM CHLORIDE 600; 310; 30; 20 MG/100ML; MG/100ML; MG/100ML; MG/100ML
INJECTION, SOLUTION INTRAVENOUS
Status: DISPENSED
Start: 2018-01-25

## (undated) RX ORDER — LIDOCAINE HYDROCHLORIDE 20 MG/ML
INJECTION, SOLUTION EPIDURAL; INFILTRATION; INTRACAUDAL; PERINEURAL
Status: DISPENSED
Start: 2018-01-25

## (undated) RX ORDER — PROPOFOL 10 MG/ML
INJECTION, EMULSION INTRAVENOUS
Status: DISPENSED
Start: 2018-01-25

## (undated) RX ORDER — DEXAMETHASONE SODIUM PHOSPHATE 4 MG/ML
INJECTION, SOLUTION INTRA-ARTICULAR; INTRALESIONAL; INTRAMUSCULAR; INTRAVENOUS; SOFT TISSUE
Status: DISPENSED
Start: 2018-01-25